# Patient Record
Sex: MALE | Race: WHITE | NOT HISPANIC OR LATINO | Employment: OTHER | URBAN - METROPOLITAN AREA
[De-identification: names, ages, dates, MRNs, and addresses within clinical notes are randomized per-mention and may not be internally consistent; named-entity substitution may affect disease eponyms.]

---

## 2019-01-01 ENCOUNTER — OFFICE VISIT (OUTPATIENT)
Dept: ONCOLOGY | Facility: CLINIC | Age: 74
End: 2019-01-01

## 2019-01-01 ENCOUNTER — CONSULT (OUTPATIENT)
Dept: RADIATION ONCOLOGY | Facility: HOSPITAL | Age: 74
End: 2019-01-01

## 2019-01-01 ENCOUNTER — HOSPITAL ENCOUNTER (OUTPATIENT)
Dept: RADIATION ONCOLOGY | Facility: HOSPITAL | Age: 74
Discharge: HOME | End: 2019-10-10

## 2019-01-01 ENCOUNTER — HOSPITAL ENCOUNTER (OUTPATIENT)
Dept: ONCOLOGY | Facility: HOSPITAL | Age: 74
Setting detail: INFUSION SERIES
Discharge: HOME | End: 2019-10-22

## 2019-01-01 ENCOUNTER — HOSPITAL ENCOUNTER (OUTPATIENT)
Dept: GENERAL RADIOLOGY | Facility: HOSPITAL | Age: 74
Discharge: HOME | End: 2019-08-20

## 2019-01-01 ENCOUNTER — HOSPITAL ENCOUNTER (OUTPATIENT)
Dept: RADIATION ONCOLOGY | Facility: HOSPITAL | Age: 74
Discharge: HOME | End: 2019-10-17

## 2019-01-01 ENCOUNTER — HOSPITAL ENCOUNTER (INPATIENT)
Facility: HOSPITAL | Age: 74
LOS: 2 days | End: 2019-11-03
Attending: HOSPITALIST | Admitting: INTERNAL MEDICINE

## 2019-01-01 ENCOUNTER — OFFICE VISIT (OUTPATIENT)
Dept: WOUND CARE | Facility: HOSPITAL | Age: 74
End: 2019-01-01

## 2019-01-01 ENCOUNTER — HOSPITAL ENCOUNTER (OUTPATIENT)
Dept: RADIATION ONCOLOGY | Facility: HOSPITAL | Age: 74
Discharge: HOME | End: 2019-09-16

## 2019-01-01 ENCOUNTER — HOSPITAL ENCOUNTER (OUTPATIENT)
Dept: RADIATION ONCOLOGY | Facility: HOSPITAL | Age: 74
Setting detail: RADIATION/ONCOLOGY SERIES
End: 2019-01-01

## 2019-01-01 ENCOUNTER — HOSPITAL ENCOUNTER (OUTPATIENT)
Dept: ONCOLOGY | Facility: HOSPITAL | Age: 74
Setting detail: INFUSION SERIES
Discharge: HOME | End: 2019-09-18

## 2019-01-01 ENCOUNTER — HOSPITAL ENCOUNTER (OUTPATIENT)
Dept: RADIATION ONCOLOGY | Facility: HOSPITAL | Age: 74
Discharge: HOME | End: 2019-10-09

## 2019-01-01 ENCOUNTER — HOSPITAL ENCOUNTER (OUTPATIENT)
Dept: RADIATION ONCOLOGY | Facility: HOSPITAL | Age: 74
Discharge: HOME | End: 2019-10-11

## 2019-01-01 ENCOUNTER — HOSPITAL ENCOUNTER (OUTPATIENT)
Dept: RADIATION ONCOLOGY | Facility: HOSPITAL | Age: 74
Discharge: HOME | End: 2019-10-14

## 2019-01-01 ENCOUNTER — HOSPITAL ENCOUNTER (OUTPATIENT)
Dept: RADIATION ONCOLOGY | Facility: HOSPITAL | Age: 74
Discharge: HOME | End: 2019-10-28

## 2019-01-01 ENCOUNTER — HOSPITAL ENCOUNTER (INPATIENT)
Dept: CARDIOLOGY | Facility: HOSPITAL | Age: 74
Discharge: HOME | End: 2019-11-01

## 2019-01-01 ENCOUNTER — HOSPITAL ENCOUNTER (OUTPATIENT)
Dept: RADIATION ONCOLOGY | Facility: HOSPITAL | Age: 74
Discharge: HOME | End: 2019-10-25

## 2019-01-01 ENCOUNTER — HOSPITAL ENCOUNTER (OUTPATIENT)
Dept: RADIATION ONCOLOGY | Facility: HOSPITAL | Age: 74
Discharge: HOME | End: 2019-09-26

## 2019-01-01 ENCOUNTER — APPOINTMENT (OUTPATIENT)
Dept: ONCOLOGY | Facility: HOSPITAL | Age: 74
End: 2019-01-01

## 2019-01-01 ENCOUNTER — HOSPITAL ENCOUNTER (OUTPATIENT)
Dept: RADIATION ONCOLOGY | Facility: HOSPITAL | Age: 74
Discharge: HOME | End: 2019-10-01

## 2019-01-01 ENCOUNTER — APPOINTMENT (OUTPATIENT)
Dept: LAB | Facility: HOSPITAL | Age: 74
End: 2019-01-01

## 2019-01-01 ENCOUNTER — HOSPITAL ENCOUNTER (OUTPATIENT)
Dept: ONCOLOGY | Facility: HOSPITAL | Age: 74
Setting detail: INFUSION SERIES
End: 2019-01-01

## 2019-01-01 ENCOUNTER — LAB (OUTPATIENT)
Dept: LAB | Facility: HOSPITAL | Age: 74
End: 2019-01-01

## 2019-01-01 ENCOUNTER — HOSPITAL ENCOUNTER (OUTPATIENT)
Dept: ONCOLOGY | Facility: HOSPITAL | Age: 74
Setting detail: INFUSION SERIES
Discharge: HOME | End: 2019-10-07

## 2019-01-01 ENCOUNTER — HOSPITAL ENCOUNTER (OUTPATIENT)
Dept: ONCOLOGY | Facility: HOSPITAL | Age: 74
Setting detail: INFUSION SERIES
Discharge: HOME | End: 2019-09-20

## 2019-01-01 ENCOUNTER — HOSPITAL ENCOUNTER (OUTPATIENT)
Dept: RADIATION ONCOLOGY | Facility: HOSPITAL | Age: 74
Discharge: HOME | End: 2019-10-07

## 2019-01-01 ENCOUNTER — HOSPITAL ENCOUNTER (OUTPATIENT)
Dept: RADIATION ONCOLOGY | Facility: HOSPITAL | Age: 74
Discharge: HOME | End: 2019-09-30

## 2019-01-01 ENCOUNTER — HOSPITAL ENCOUNTER (OUTPATIENT)
Dept: RADIATION ONCOLOGY | Facility: HOSPITAL | Age: 74
Discharge: HOME | End: 2019-10-23

## 2019-01-01 ENCOUNTER — HOSPITAL ENCOUNTER (OUTPATIENT)
Dept: RADIATION ONCOLOGY | Facility: HOSPITAL | Age: 74
Discharge: HOME | End: 2019-09-17

## 2019-01-01 ENCOUNTER — HOSPITAL ENCOUNTER (OUTPATIENT)
Dept: ONCOLOGY | Facility: HOSPITAL | Age: 74
Setting detail: INFUSION SERIES
Discharge: HOME | End: 2019-10-16

## 2019-01-01 ENCOUNTER — HOSPITAL ENCOUNTER (OUTPATIENT)
Dept: ONCOLOGY | Facility: HOSPITAL | Age: 74
Discharge: HOME | End: 2019-10-28
Admitting: INTERNAL MEDICINE

## 2019-01-01 ENCOUNTER — HOSPITAL ENCOUNTER (OUTPATIENT)
Dept: RADIATION ONCOLOGY | Facility: HOSPITAL | Age: 74
Discharge: HOME | End: 2019-10-22

## 2019-01-01 ENCOUNTER — TELEPHONE (OUTPATIENT)
Dept: ONCOLOGY | Facility: CLINIC | Age: 74
End: 2019-01-01

## 2019-01-01 ENCOUNTER — RESULTS ENCOUNTER (OUTPATIENT)
Dept: ONCOLOGY | Facility: CLINIC | Age: 74
End: 2019-01-01

## 2019-01-01 ENCOUNTER — HOSPITAL ENCOUNTER (OUTPATIENT)
Dept: RADIATION ONCOLOGY | Facility: HOSPITAL | Age: 74
Discharge: HOME | End: 2019-10-21

## 2019-01-01 ENCOUNTER — HOSPITAL ENCOUNTER (OUTPATIENT)
Dept: CT IMAGING | Facility: HOSPITAL | Age: 74
Discharge: HOME | End: 2019-10-01
Admitting: RADIOLOGY

## 2019-01-01 ENCOUNTER — HOSPITAL ENCOUNTER (OUTPATIENT)
Dept: RADIATION ONCOLOGY | Facility: HOSPITAL | Age: 74
Discharge: HOME | End: 2019-09-27

## 2019-01-01 ENCOUNTER — APPOINTMENT (OUTPATIENT)
Dept: GENERAL RADIOLOGY | Facility: HOSPITAL | Age: 74
End: 2019-01-01

## 2019-01-01 ENCOUNTER — HOSPITAL ENCOUNTER (OUTPATIENT)
Dept: RADIATION ONCOLOGY | Facility: HOSPITAL | Age: 74
Discharge: HOME | End: 2019-10-08

## 2019-01-01 ENCOUNTER — HOSPITAL ENCOUNTER (OUTPATIENT)
Dept: RADIATION ONCOLOGY | Facility: HOSPITAL | Age: 74
Discharge: HOME | End: 2019-09-19

## 2019-01-01 ENCOUNTER — HOSPITAL ENCOUNTER (OUTPATIENT)
Dept: ONCOLOGY | Facility: HOSPITAL | Age: 74
Setting detail: INFUSION SERIES
Discharge: HOME | End: 2019-10-14

## 2019-01-01 ENCOUNTER — HOSPITAL ENCOUNTER (OUTPATIENT)
Dept: ONCOLOGY | Facility: HOSPITAL | Age: 74
Setting detail: INFUSION SERIES
Discharge: HOME | End: 2019-10-24

## 2019-01-01 ENCOUNTER — HOSPITAL ENCOUNTER (OUTPATIENT)
Dept: ONCOLOGY | Facility: HOSPITAL | Age: 74
Setting detail: INFUSION SERIES
Discharge: HOME | End: 2019-11-01

## 2019-01-01 ENCOUNTER — HOSPITAL ENCOUNTER (OUTPATIENT)
Dept: ONCOLOGY | Facility: HOSPITAL | Age: 74
Setting detail: INFUSION SERIES
Discharge: HOME | End: 2019-09-23

## 2019-01-01 ENCOUNTER — HOSPITAL ENCOUNTER (OUTPATIENT)
Dept: RADIATION ONCOLOGY | Facility: HOSPITAL | Age: 74
Discharge: HOME | End: 2019-10-03

## 2019-01-01 ENCOUNTER — HOSPITAL ENCOUNTER (OUTPATIENT)
Dept: RADIATION ONCOLOGY | Facility: HOSPITAL | Age: 74
Discharge: HOME | End: 2019-09-24

## 2019-01-01 ENCOUNTER — DOCUMENTATION (OUTPATIENT)
Dept: ONCOLOGY | Facility: HOSPITAL | Age: 74
End: 2019-01-01

## 2019-01-01 ENCOUNTER — HOSPITAL ENCOUNTER (OUTPATIENT)
Dept: ONCOLOGY | Facility: HOSPITAL | Age: 74
Discharge: HOME | End: 2019-10-30
Admitting: NURSE PRACTITIONER

## 2019-01-01 ENCOUNTER — HOSPITAL ENCOUNTER (OUTPATIENT)
Dept: PET IMAGING | Facility: HOSPITAL | Age: 74
Discharge: HOME | End: 2019-08-14
Admitting: INTERNAL MEDICINE

## 2019-01-01 ENCOUNTER — HOSPITAL ENCOUNTER (OUTPATIENT)
Dept: RADIATION ONCOLOGY | Facility: HOSPITAL | Age: 74
Discharge: HOME | End: 2019-09-20

## 2019-01-01 ENCOUNTER — HOSPITAL ENCOUNTER (OUTPATIENT)
Dept: ONCOLOGY | Facility: HOSPITAL | Age: 74
Discharge: HOME | End: 2019-10-31
Admitting: INTERNAL MEDICINE

## 2019-01-01 ENCOUNTER — HOSPITAL ENCOUNTER (OUTPATIENT)
Dept: INFUSION THERAPY | Facility: HOSPITAL | Age: 74
Setting detail: INFUSION SERIES
Discharge: HOME | End: 2019-09-13

## 2019-01-01 ENCOUNTER — HOSPITAL ENCOUNTER (OUTPATIENT)
Dept: INFUSION THERAPY | Facility: HOSPITAL | Age: 74
Setting detail: INFUSION SERIES
Discharge: HOME | End: 2019-09-11

## 2019-01-01 ENCOUNTER — HOSPITAL ENCOUNTER (OUTPATIENT)
Dept: ONCOLOGY | Facility: HOSPITAL | Age: 74
Setting detail: INFUSION SERIES
Discharge: HOME | End: 2019-10-23

## 2019-01-01 ENCOUNTER — HOSPITAL ENCOUNTER (OUTPATIENT)
Dept: RADIATION ONCOLOGY | Facility: HOSPITAL | Age: 74
Discharge: HOME | End: 2019-10-15

## 2019-01-01 ENCOUNTER — HOSPITAL ENCOUNTER (OUTPATIENT)
Dept: RADIATION ONCOLOGY | Facility: HOSPITAL | Age: 74
Discharge: HOME | End: 2019-10-24

## 2019-01-01 ENCOUNTER — HOSPITAL ENCOUNTER (OUTPATIENT)
Dept: ONCOLOGY | Facility: HOSPITAL | Age: 74
Discharge: HOME | End: 2019-10-29
Admitting: NURSE PRACTITIONER

## 2019-01-01 ENCOUNTER — HOSPITAL ENCOUNTER (OUTPATIENT)
Dept: OTHER | Facility: HOSPITAL | Age: 74
Discharge: HOME | End: 2019-08-12

## 2019-01-01 ENCOUNTER — HOSPITAL ENCOUNTER (OUTPATIENT)
Dept: ONCOLOGY | Facility: HOSPITAL | Age: 74
Setting detail: INFUSION SERIES
Discharge: HOME | End: 2019-09-16

## 2019-01-01 ENCOUNTER — HOSPITAL ENCOUNTER (OUTPATIENT)
Dept: INFUSION THERAPY | Facility: HOSPITAL | Age: 74
Setting detail: INFUSION SERIES
Discharge: HOME | End: 2019-10-30

## 2019-01-01 ENCOUNTER — HOSPITAL ENCOUNTER (OUTPATIENT)
Dept: ONCOLOGY | Facility: HOSPITAL | Age: 74
Setting detail: INFUSION SERIES
Discharge: HOME | End: 2019-09-19

## 2019-01-01 ENCOUNTER — HOSPITAL ENCOUNTER (OUTPATIENT)
Dept: RADIATION ONCOLOGY | Facility: HOSPITAL | Age: 74
Discharge: HOME | End: 2019-09-25

## 2019-01-01 ENCOUNTER — HOSPITAL ENCOUNTER (OUTPATIENT)
Dept: RADIATION ONCOLOGY | Facility: HOSPITAL | Age: 74
Discharge: HOME | End: 2019-09-23

## 2019-01-01 ENCOUNTER — HOSPITAL ENCOUNTER (OUTPATIENT)
Dept: ONCOLOGY | Facility: HOSPITAL | Age: 74
Setting detail: INFUSION SERIES
Discharge: HOME | End: 2019-10-29

## 2019-01-01 ENCOUNTER — HOSPITAL ENCOUNTER (OUTPATIENT)
Dept: RADIATION ONCOLOGY | Facility: HOSPITAL | Age: 74
Discharge: HOME | End: 2019-10-04

## 2019-01-01 ENCOUNTER — HOSPITAL ENCOUNTER (OUTPATIENT)
Dept: ONCOLOGY | Facility: HOSPITAL | Age: 74
Setting detail: INFUSION SERIES
Discharge: HOME | End: 2019-09-17

## 2019-01-01 ENCOUNTER — HOSPITAL ENCOUNTER (OUTPATIENT)
Dept: CT IMAGING | Facility: HOSPITAL | Age: 74
Discharge: HOME | End: 2019-08-20
Admitting: INTERNAL MEDICINE

## 2019-01-01 ENCOUNTER — HOSPITAL ENCOUNTER (OUTPATIENT)
Dept: ONCOLOGY | Facility: HOSPITAL | Age: 74
Discharge: HOME | End: 2019-11-01
Admitting: INTERNAL MEDICINE

## 2019-01-01 ENCOUNTER — HOSPITAL ENCOUNTER (OUTPATIENT)
Dept: RADIATION ONCOLOGY | Facility: HOSPITAL | Age: 74
Discharge: HOME | End: 2019-10-18

## 2019-01-01 ENCOUNTER — HOSPITAL ENCOUNTER (OUTPATIENT)
Dept: INTERVENTIONAL RADIOLOGY/VASCULAR | Facility: HOSPITAL | Age: 74
Discharge: HOME | End: 2019-09-10
Admitting: NURSE PRACTITIONER

## 2019-01-01 ENCOUNTER — CONSULT (OUTPATIENT)
Dept: ONCOLOGY | Facility: CLINIC | Age: 74
End: 2019-01-01

## 2019-01-01 ENCOUNTER — HOSPITAL ENCOUNTER (OUTPATIENT)
Dept: RADIATION ONCOLOGY | Facility: HOSPITAL | Age: 74
Discharge: HOME | End: 2019-09-18

## 2019-01-01 ENCOUNTER — HOSPITAL ENCOUNTER (OUTPATIENT)
Dept: RADIATION ONCOLOGY | Facility: HOSPITAL | Age: 74
Discharge: HOME | End: 2019-10-02

## 2019-01-01 ENCOUNTER — APPOINTMENT (OUTPATIENT)
Dept: WOUND CARE | Facility: HOSPITAL | Age: 74
End: 2019-01-01

## 2019-01-01 ENCOUNTER — HOSPITAL ENCOUNTER (OUTPATIENT)
Dept: ONCOLOGY | Facility: HOSPITAL | Age: 74
Setting detail: INFUSION SERIES
Discharge: HOME | End: 2019-10-25

## 2019-01-01 ENCOUNTER — HOSPITAL ENCOUNTER (OUTPATIENT)
Dept: GENERAL RADIOLOGY | Facility: HOSPITAL | Age: 74
Discharge: HOME | End: 2019-10-14
Admitting: FAMILY MEDICINE

## 2019-01-01 ENCOUNTER — HOSPITAL ENCOUNTER (OUTPATIENT)
Dept: ONCOLOGY | Facility: HOSPITAL | Age: 74
Setting detail: INFUSION SERIES
Discharge: HOME | End: 2019-09-30

## 2019-01-01 ENCOUNTER — HOSPITAL ENCOUNTER (OUTPATIENT)
Dept: RADIATION ONCOLOGY | Facility: HOSPITAL | Age: 74
Discharge: HOME | End: 2019-10-16

## 2019-01-01 ENCOUNTER — HOSPITAL ENCOUNTER (OUTPATIENT)
Dept: ONCOLOGY | Facility: HOSPITAL | Age: 74
Setting detail: INFUSION SERIES
Discharge: HOME | End: 2019-10-21

## 2019-01-01 VITALS
HEART RATE: 103 BPM | DIASTOLIC BLOOD PRESSURE: 69 MMHG | HEIGHT: 65 IN | WEIGHT: 103 LBS | BODY MASS INDEX: 17.16 KG/M2 | TEMPERATURE: 97.7 F | RESPIRATION RATE: 18 BRPM | SYSTOLIC BLOOD PRESSURE: 103 MMHG

## 2019-01-01 VITALS
WEIGHT: 106.5 LBS | HEART RATE: 68 BPM | HEIGHT: 65 IN | TEMPERATURE: 97.9 F | RESPIRATION RATE: 18 BRPM | BODY MASS INDEX: 17.74 KG/M2 | DIASTOLIC BLOOD PRESSURE: 59 MMHG | SYSTOLIC BLOOD PRESSURE: 103 MMHG

## 2019-01-01 VITALS
SYSTOLIC BLOOD PRESSURE: 108 MMHG | HEART RATE: 92 BPM | HEIGHT: 65 IN | OXYGEN SATURATION: 91 % | WEIGHT: 103 LBS | DIASTOLIC BLOOD PRESSURE: 61 MMHG | TEMPERATURE: 97.6 F | RESPIRATION RATE: 18 BRPM | BODY MASS INDEX: 17.16 KG/M2

## 2019-01-01 VITALS
HEIGHT: 65 IN | WEIGHT: 104 LBS | TEMPERATURE: 98.1 F | SYSTOLIC BLOOD PRESSURE: 131 MMHG | HEART RATE: 91 BPM | BODY MASS INDEX: 17.33 KG/M2 | DIASTOLIC BLOOD PRESSURE: 68 MMHG

## 2019-01-01 VITALS
HEIGHT: 65 IN | OXYGEN SATURATION: 99 % | DIASTOLIC BLOOD PRESSURE: 61 MMHG | BODY MASS INDEX: 16.33 KG/M2 | TEMPERATURE: 98.2 F | WEIGHT: 98 LBS | RESPIRATION RATE: 18 BRPM | SYSTOLIC BLOOD PRESSURE: 92 MMHG | HEART RATE: 98 BPM

## 2019-01-01 VITALS
HEART RATE: 76 BPM | DIASTOLIC BLOOD PRESSURE: 57 MMHG | OXYGEN SATURATION: 96 % | RESPIRATION RATE: 18 BRPM | SYSTOLIC BLOOD PRESSURE: 104 MMHG | HEIGHT: 65 IN | BODY MASS INDEX: 18.16 KG/M2 | TEMPERATURE: 98.4 F | WEIGHT: 109 LBS

## 2019-01-01 VITALS
OXYGEN SATURATION: 98 % | SYSTOLIC BLOOD PRESSURE: 115 MMHG | HEIGHT: 65 IN | WEIGHT: 111 LBS | TEMPERATURE: 98.2 F | BODY MASS INDEX: 18.49 KG/M2 | DIASTOLIC BLOOD PRESSURE: 63 MMHG | HEART RATE: 74 BPM | RESPIRATION RATE: 18 BRPM

## 2019-01-01 VITALS
TEMPERATURE: 97.2 F | BODY MASS INDEX: 17.07 KG/M2 | RESPIRATION RATE: 18 BRPM | OXYGEN SATURATION: 98 % | DIASTOLIC BLOOD PRESSURE: 51 MMHG | HEART RATE: 111 BPM | SYSTOLIC BLOOD PRESSURE: 86 MMHG | HEIGHT: 65 IN

## 2019-01-01 VITALS
RESPIRATION RATE: 18 BRPM | SYSTOLIC BLOOD PRESSURE: 120 MMHG | WEIGHT: 105.6 LBS | BODY MASS INDEX: 17.59 KG/M2 | HEART RATE: 82 BPM | TEMPERATURE: 98.4 F | HEIGHT: 65 IN | DIASTOLIC BLOOD PRESSURE: 68 MMHG

## 2019-01-01 VITALS
DIASTOLIC BLOOD PRESSURE: 66 MMHG | SYSTOLIC BLOOD PRESSURE: 100 MMHG | TEMPERATURE: 98 F | HEART RATE: 75 BPM | HEIGHT: 65 IN | WEIGHT: 109.1 LBS | RESPIRATION RATE: 18 BRPM | BODY MASS INDEX: 18.18 KG/M2

## 2019-01-01 VITALS
HEART RATE: 80 BPM | BODY MASS INDEX: 17.49 KG/M2 | WEIGHT: 105 LBS | DIASTOLIC BLOOD PRESSURE: 62 MMHG | HEIGHT: 65 IN | TEMPERATURE: 98.4 F | RESPIRATION RATE: 16 BRPM | SYSTOLIC BLOOD PRESSURE: 105 MMHG

## 2019-01-01 VITALS
TEMPERATURE: 98.7 F | WEIGHT: 101.5 LBS | HEART RATE: 89 BPM | DIASTOLIC BLOOD PRESSURE: 68 MMHG | RESPIRATION RATE: 20 BRPM | SYSTOLIC BLOOD PRESSURE: 105 MMHG | BODY MASS INDEX: 16.89 KG/M2

## 2019-01-01 VITALS
DIASTOLIC BLOOD PRESSURE: 51 MMHG | SYSTOLIC BLOOD PRESSURE: 100 MMHG | RESPIRATION RATE: 18 BRPM | TEMPERATURE: 98 F | HEART RATE: 124 BPM | OXYGEN SATURATION: 94 %

## 2019-01-01 VITALS
SYSTOLIC BLOOD PRESSURE: 123 MMHG | RESPIRATION RATE: 22 BRPM | HEART RATE: 84 BPM | HEIGHT: 65 IN | DIASTOLIC BLOOD PRESSURE: 64 MMHG | WEIGHT: 105.5 LBS | BODY MASS INDEX: 17.58 KG/M2 | TEMPERATURE: 97.7 F

## 2019-01-01 VITALS
WEIGHT: 102.6 LBS | BODY MASS INDEX: 17.09 KG/M2 | HEIGHT: 65 IN | SYSTOLIC BLOOD PRESSURE: 100 MMHG | RESPIRATION RATE: 18 BRPM | DIASTOLIC BLOOD PRESSURE: 62 MMHG | TEMPERATURE: 98.6 F | HEART RATE: 103 BPM

## 2019-01-01 VITALS
HEIGHT: 65 IN | RESPIRATION RATE: 17 BRPM | DIASTOLIC BLOOD PRESSURE: 59 MMHG | SYSTOLIC BLOOD PRESSURE: 98 MMHG | OXYGEN SATURATION: 96 % | WEIGHT: 105 LBS | HEART RATE: 72 BPM | TEMPERATURE: 98.7 F | BODY MASS INDEX: 17.49 KG/M2

## 2019-01-01 VITALS
DIASTOLIC BLOOD PRESSURE: 70 MMHG | HEIGHT: 65 IN | SYSTOLIC BLOOD PRESSURE: 111 MMHG | WEIGHT: 104 LBS | OXYGEN SATURATION: 95 % | RESPIRATION RATE: 18 BRPM | TEMPERATURE: 97.4 F | HEART RATE: 84 BPM | BODY MASS INDEX: 17.33 KG/M2

## 2019-01-01 VITALS
SYSTOLIC BLOOD PRESSURE: 109 MMHG | HEIGHT: 65 IN | WEIGHT: 105.2 LBS | DIASTOLIC BLOOD PRESSURE: 69 MMHG | DIASTOLIC BLOOD PRESSURE: 63 MMHG | RESPIRATION RATE: 18 BRPM | OXYGEN SATURATION: 94 % | WEIGHT: 103 LBS | SYSTOLIC BLOOD PRESSURE: 113 MMHG | TEMPERATURE: 98.5 F | TEMPERATURE: 98.3 F | BODY MASS INDEX: 17.16 KG/M2 | RESPIRATION RATE: 18 BRPM | HEART RATE: 89 BPM | OXYGEN SATURATION: 95 % | HEART RATE: 77 BPM | BODY MASS INDEX: 17.51 KG/M2

## 2019-01-01 VITALS
HEIGHT: 65 IN | RESPIRATION RATE: 14 BRPM | OXYGEN SATURATION: 95 % | SYSTOLIC BLOOD PRESSURE: 115 MMHG | BODY MASS INDEX: 17.33 KG/M2 | HEART RATE: 81 BPM | DIASTOLIC BLOOD PRESSURE: 66 MMHG | TEMPERATURE: 97.3 F | WEIGHT: 104 LBS

## 2019-01-01 VITALS
TEMPERATURE: 98.7 F | HEART RATE: 100 BPM | BODY MASS INDEX: 17.07 KG/M2 | HEIGHT: 65 IN | DIASTOLIC BLOOD PRESSURE: 53 MMHG | SYSTOLIC BLOOD PRESSURE: 93 MMHG

## 2019-01-01 VITALS
HEIGHT: 65 IN | HEART RATE: 75 BPM | OXYGEN SATURATION: 93 % | DIASTOLIC BLOOD PRESSURE: 53 MMHG | SYSTOLIC BLOOD PRESSURE: 94 MMHG | TEMPERATURE: 97 F | BODY MASS INDEX: 17.49 KG/M2 | WEIGHT: 105 LBS | RESPIRATION RATE: 20 BRPM

## 2019-01-01 VITALS
HEART RATE: 79 BPM | DIASTOLIC BLOOD PRESSURE: 67 MMHG | BODY MASS INDEX: 17.16 KG/M2 | HEIGHT: 65 IN | SYSTOLIC BLOOD PRESSURE: 109 MMHG | WEIGHT: 103 LBS | OXYGEN SATURATION: 96 % | TEMPERATURE: 98.1 F | RESPIRATION RATE: 18 BRPM

## 2019-01-01 VITALS
WEIGHT: 103.8 LBS | SYSTOLIC BLOOD PRESSURE: 92 MMHG | RESPIRATION RATE: 20 BRPM | TEMPERATURE: 97.8 F | HEIGHT: 65 IN | HEART RATE: 78 BPM | BODY MASS INDEX: 17.29 KG/M2 | DIASTOLIC BLOOD PRESSURE: 58 MMHG

## 2019-01-01 VITALS
WEIGHT: 106 LBS | OXYGEN SATURATION: 95 % | HEART RATE: 65 BPM | BODY MASS INDEX: 17.66 KG/M2 | RESPIRATION RATE: 14 BRPM | SYSTOLIC BLOOD PRESSURE: 110 MMHG | DIASTOLIC BLOOD PRESSURE: 68 MMHG | TEMPERATURE: 97.6 F | HEIGHT: 65 IN

## 2019-01-01 VITALS
RESPIRATION RATE: 22 BRPM | BODY MASS INDEX: 17.16 KG/M2 | TEMPERATURE: 97.6 F | WEIGHT: 103 LBS | HEIGHT: 65 IN | HEART RATE: 92 BPM | SYSTOLIC BLOOD PRESSURE: 115 MMHG | DIASTOLIC BLOOD PRESSURE: 76 MMHG

## 2019-01-01 VITALS — SYSTOLIC BLOOD PRESSURE: 116 MMHG | TEMPERATURE: 97.8 F | HEART RATE: 83 BPM | DIASTOLIC BLOOD PRESSURE: 72 MMHG

## 2019-01-01 VITALS
TEMPERATURE: 97.8 F | SYSTOLIC BLOOD PRESSURE: 107 MMHG | HEART RATE: 70 BPM | DIASTOLIC BLOOD PRESSURE: 61 MMHG | OXYGEN SATURATION: 93 % | BODY MASS INDEX: 17.49 KG/M2 | RESPIRATION RATE: 18 BRPM | HEIGHT: 65 IN | WEIGHT: 105 LBS

## 2019-01-01 VITALS
WEIGHT: 106 LBS | DIASTOLIC BLOOD PRESSURE: 69 MMHG | BODY MASS INDEX: 17.66 KG/M2 | TEMPERATURE: 98 F | HEIGHT: 65 IN | HEART RATE: 72 BPM | OXYGEN SATURATION: 94 % | RESPIRATION RATE: 18 BRPM | SYSTOLIC BLOOD PRESSURE: 109 MMHG

## 2019-01-01 VITALS
HEART RATE: 75 BPM | RESPIRATION RATE: 18 BRPM | SYSTOLIC BLOOD PRESSURE: 108 MMHG | TEMPERATURE: 98.1 F | DIASTOLIC BLOOD PRESSURE: 55 MMHG

## 2019-01-01 DIAGNOSIS — Z00.6 EXAMINATION FOR NORMAL COMPARISON OR CONTROL IN CLINICAL RESEARCH: ICD-10-CM

## 2019-01-01 DIAGNOSIS — E86.0 DEHYDRATION: ICD-10-CM

## 2019-01-01 DIAGNOSIS — C34.31 MALIGNANT NEOPLASM OF LOWER LOBE OF RIGHT LUNG (HCC): Primary | ICD-10-CM

## 2019-01-01 DIAGNOSIS — C34.31 MALIGNANT NEOPLASM OF LOWER LOBE OF RIGHT LUNG (HCC): ICD-10-CM

## 2019-01-01 DIAGNOSIS — E83.42 HYPOMAGNESEMIA: ICD-10-CM

## 2019-01-01 DIAGNOSIS — R91.8 LUNG MASS: ICD-10-CM

## 2019-01-01 DIAGNOSIS — T45.1X5A CHEMOTHERAPY INDUCED NEUTROPENIA (HCC): ICD-10-CM

## 2019-01-01 DIAGNOSIS — C77.9 SECONDARY AND UNSPECIFIED MALIGNANT NEOPLASM OF LYMPH NODE, UNSPECIFIED (HCC): ICD-10-CM

## 2019-01-01 DIAGNOSIS — I47.1 SUPRAVENTRICULAR TACHYCARDIA (HCC): ICD-10-CM

## 2019-01-01 DIAGNOSIS — R53.1 WEAKNESS: ICD-10-CM

## 2019-01-01 DIAGNOSIS — D70.1 CHEMOTHERAPY INDUCED NEUTROPENIA (HCC): ICD-10-CM

## 2019-01-01 DIAGNOSIS — E86.0 DEHYDRATION: Primary | ICD-10-CM

## 2019-01-01 DIAGNOSIS — I48.91 ATRIAL FIBRILLATION WITH RVR (HCC): Primary | ICD-10-CM

## 2019-01-01 DIAGNOSIS — D69.6 THROMBOCYTOPENIA (HCC): Primary | ICD-10-CM

## 2019-01-01 DIAGNOSIS — L98.429 SKIN ULCER OF SACRUM, UNSPECIFIED ULCER STAGE (HCC): ICD-10-CM

## 2019-01-01 DIAGNOSIS — K59.03 CONSTIPATION DUE TO OPIOID THERAPY: ICD-10-CM

## 2019-01-01 DIAGNOSIS — R11.0 NAUSEA: ICD-10-CM

## 2019-01-01 DIAGNOSIS — J43.1 PANLOBULAR EMPHYSEMA (HCC): ICD-10-CM

## 2019-01-01 DIAGNOSIS — D70.1 CHEMOTHERAPY INDUCED NEUTROPENIA (HCC): Primary | ICD-10-CM

## 2019-01-01 DIAGNOSIS — R91.8 LUNG MASS: Primary | ICD-10-CM

## 2019-01-01 DIAGNOSIS — G89.3 CANCER ASSOCIATED PAIN: ICD-10-CM

## 2019-01-01 DIAGNOSIS — T40.2X5A CONSTIPATION DUE TO OPIOID THERAPY: ICD-10-CM

## 2019-01-01 DIAGNOSIS — T45.1X5A CHEMOTHERAPY INDUCED NEUTROPENIA (HCC): Primary | ICD-10-CM

## 2019-01-01 LAB
ABO + RH BLD: NORMAL
ABO GROUP BLD: NORMAL
ABO GROUP BLD: NORMAL
ALBUMIN SERPL-MCNC: 2.5 G/DL (ref 3.5–4.8)
ALBUMIN SERPL-MCNC: 2.5 G/DL (ref 3.5–4.8)
ALBUMIN SERPL-MCNC: 2.7 G/DL (ref 3.5–4.8)
ALBUMIN SERPL-MCNC: 2.7 G/DL (ref 3.5–4.8)
ALBUMIN SERPL-MCNC: 2.8 G/DL (ref 3.5–4.8)
ALBUMIN SERPL-MCNC: 2.9 G/DL (ref 3.5–4.8)
ALBUMIN SERPL-MCNC: 2.9 G/DL (ref 3.5–4.8)
ALBUMIN SERPL-MCNC: 2.9 G/DL (ref 3.5–5.2)
ALBUMIN SERPL-MCNC: 3.4 G/DL (ref 3.5–4.8)
ALBUMIN SERPL-MCNC: 3.8 G/DL (ref 3.5–4.8)
ALBUMIN/GLOB SERPL: 0.9 G/DL (ref 1–1.7)
ALBUMIN/GLOB SERPL: 1 G/DL (ref 1–1.7)
ALBUMIN/GLOB SERPL: 1 G/DL (ref 1–1.7)
ALBUMIN/GLOB SERPL: 1.2 G/DL
ALBUMIN/GLOB SERPL: 1.4 {RATIO} (ref 1.2–2.2)
ALP SERPL-CCNC: 102 U/L (ref 32–91)
ALP SERPL-CCNC: 127 IU/L (ref 39–117)
ALP SERPL-CCNC: 76 U/L (ref 32–91)
ALP SERPL-CCNC: 80 U/L (ref 32–91)
ALP SERPL-CCNC: 83 U/L (ref 32–91)
ALP SERPL-CCNC: 83 U/L (ref 32–91)
ALP SERPL-CCNC: 84 U/L (ref 32–91)
ALP SERPL-CCNC: 87 U/L (ref 32–91)
ALP SERPL-CCNC: 88 U/L (ref 32–91)
ALP SERPL-CCNC: 94 U/L (ref 39–117)
ALT SERPL W P-5'-P-CCNC: 23 U/L (ref 17–63)
ALT SERPL W P-5'-P-CCNC: 31 U/L (ref 17–63)
ALT SERPL W P-5'-P-CCNC: 31 U/L (ref 17–63)
ALT SERPL W P-5'-P-CCNC: 32 U/L (ref 17–63)
ALT SERPL W P-5'-P-CCNC: 33 U/L (ref 17–63)
ALT SERPL W P-5'-P-CCNC: 35 U/L (ref 17–63)
ALT SERPL W P-5'-P-CCNC: 35 U/L (ref 17–63)
ALT SERPL W P-5'-P-CCNC: 36 U/L (ref 17–63)
ALT SERPL W P-5'-P-CCNC: 37 U/L (ref 1–41)
ALT SERPL-CCNC: 31 IU/L (ref 0–44)
ANION GAP SERPL CALCULATED.3IONS-SCNC: 11 MMOL/L (ref 5–15)
ANION GAP SERPL CALCULATED.3IONS-SCNC: 11.2 MMOL/L (ref 5–15)
ANION GAP SERPL CALCULATED.3IONS-SCNC: 12 MMOL/L (ref 5–15)
ANION GAP SERPL CALCULATED.3IONS-SCNC: 12.1 MMOL/L (ref 5–15)
ANION GAP SERPL CALCULATED.3IONS-SCNC: 12.8 MMOL/L (ref 5–15)
ANION GAP SERPL CALCULATED.3IONS-SCNC: 15.5 MMOL/L (ref 5–15)
ANION GAP SERPL CALCULATED.3IONS-SCNC: 7 MMOL/L (ref 5–15)
ANION GAP SERPL CALCULATED.3IONS-SCNC: 8 MMOL/L (ref 5–15)
ANION GAP SERPL CALCULATED.3IONS-SCNC: 9 MMOL/L (ref 5–15)
APTT PPP: 24.5 SECONDS (ref 24–31)
APTT PPP: 25.2 SECONDS (ref 24–31)
ARTERIAL PATENCY WRIST A: POSITIVE
AST SERPL-CCNC: 22 U/L (ref 15–41)
AST SERPL-CCNC: 22 U/L (ref 1–40)
AST SERPL-CCNC: 24 U/L (ref 15–41)
AST SERPL-CCNC: 25 U/L (ref 15–41)
AST SERPL-CCNC: 26 U/L (ref 15–41)
AST SERPL-CCNC: 27 U/L (ref 15–41)
AST SERPL-CCNC: 30 IU/L (ref 0–40)
AST SERPL-CCNC: 30 U/L (ref 15–41)
ATMOSPHERIC PRESS: ABNORMAL MM[HG]
B PERT DNA SPEC QL NAA+PROBE: NOT DETECTED
BASE EXCESS BLDA CALC-SCNC: 3.7 MMOL/L (ref 0–3)
BASE EXCESS BLDA CALC-SCNC: 4.9 MMOL/L (ref 0–3)
BASE EXCESS BLDA CALC-SCNC: 6.5 MMOL/L (ref 0–3)
BASOPHILS # BLD AUTO: 0 10*3/MM3 (ref 0–0.2)
BASOPHILS # BLD AUTO: 0 X10E3/UL (ref 0–0.2)
BASOPHILS # BLD AUTO: 0.01 10*3/MM3 (ref 0–0.2)
BASOPHILS # BLD AUTO: 0.02 10*3/MM3 (ref 0–0.2)
BASOPHILS # BLD AUTO: 0.02 10*3/MM3 (ref 0–0.2)
BASOPHILS # BLD AUTO: 0.04 10*3/MM3 (ref 0–0.2)
BASOPHILS # BLD AUTO: 0.04 10*3/MM3 (ref 0–0.2)
BASOPHILS # BLD AUTO: 0.1 10*3/MM3 (ref 0–0.2)
BASOPHILS # BLD AUTO: 0.1 10*3/MM3 (ref 0–0.2)
BASOPHILS NFR BLD AUTO: 0 %
BASOPHILS NFR BLD AUTO: 0 % (ref 0–1.5)
BASOPHILS NFR BLD AUTO: 0.1 % (ref 0–1.5)
BASOPHILS NFR BLD AUTO: 0.2 % (ref 0–1.5)
BASOPHILS NFR BLD AUTO: 0.4 % (ref 0–1.5)
BASOPHILS NFR BLD AUTO: 0.5 % (ref 0–1.5)
BASOPHILS NFR BLD AUTO: 0.7 % (ref 0–1.5)
BASOPHILS NFR BLD AUTO: 1.1 % (ref 0–1.5)
BASOPHILS NFR BLD AUTO: ABNORMAL %
BDY SITE: ABNORMAL
BH BB BLOOD EXPIRATION DATE: NORMAL
BH BB BLOOD TYPE BARCODE: 6200
BH BB DISPENSE STATUS: NORMAL
BH BB PRODUCT CODE: NORMAL
BH BB UNIT NUMBER: NORMAL
BH CV ECHO MEAS - ACS: 1.7 CM
BH CV ECHO MEAS - AO MAX PG (FULL): 1.6 MMHG
BH CV ECHO MEAS - AO MAX PG: 7.2 MMHG
BH CV ECHO MEAS - AO MEAN PG (FULL): 1.6 MMHG
BH CV ECHO MEAS - AO MEAN PG: 3.9 MMHG
BH CV ECHO MEAS - AO ROOT AREA (BSA CORRECTED): 1.9
BH CV ECHO MEAS - AO ROOT AREA: 6.4 CM^2
BH CV ECHO MEAS - AO ROOT DIAM: 2.9 CM
BH CV ECHO MEAS - AO V2 MAX: 134.3 CM/SEC
BH CV ECHO MEAS - AO V2 MEAN: 91 CM/SEC
BH CV ECHO MEAS - AO V2 VTI: 19 CM
BH CV ECHO MEAS - AORTIC HR: 105.8 BPM
BH CV ECHO MEAS - AORTIC R-R: 0.57 SEC
BH CV ECHO MEAS - ASC AORTA: 2.7 CM
BH CV ECHO MEAS - AVA(I,A): 2 CM^2
BH CV ECHO MEAS - AVA(I,D): 2 CM^2
BH CV ECHO MEAS - AVA(V,A): 2.3 CM^2
BH CV ECHO MEAS - AVA(V,D): 2.3 CM^2
BH CV ECHO MEAS - BSA(HAYCOCK): 1.4 M^2
BH CV ECHO MEAS - BSA: 1.5 M^2
BH CV ECHO MEAS - BZI_BMI: 17 KILOGRAMS/M^2
BH CV ECHO MEAS - BZI_METRIC_HEIGHT: 165.1 CM
BH CV ECHO MEAS - BZI_METRIC_WEIGHT: 46.3 KG
BH CV ECHO MEAS - CI(AO): 8.7 L/MIN/M^2
BH CV ECHO MEAS - CI(LVOT): 2.8 L/MIN/M^2
BH CV ECHO MEAS - CO(AO): 12.9 L/MIN
BH CV ECHO MEAS - CO(LVOT): 4.1 L/MIN
BH CV ECHO MEAS - EDV(CUBED): 144.8 ML
BH CV ECHO MEAS - EDV(MOD-SP4): 112.7 ML
BH CV ECHO MEAS - EDV(TEICH): 132.5 ML
BH CV ECHO MEAS - EF(CUBED): 23.4 %
BH CV ECHO MEAS - EF(MOD-BP): 20 %
BH CV ECHO MEAS - EF(MOD-SP4): 31 %
BH CV ECHO MEAS - EF(TEICH): 18.6 %
BH CV ECHO MEAS - ESV(CUBED): 110.9 ML
BH CV ECHO MEAS - ESV(MOD-SP4): 77.7 ML
BH CV ECHO MEAS - ESV(TEICH): 107.8 ML
BH CV ECHO MEAS - FS: 8.5 %
BH CV ECHO MEAS - IVS/LVPW: 0.7
BH CV ECHO MEAS - IVSD: 0.49 CM
BH CV ECHO MEAS - LA DIMENSION(2D): 3.4 CM
BH CV ECHO MEAS - LV DIASTOLIC VOL/BSA (35-75): 75.9 ML/M^2
BH CV ECHO MEAS - LV MASS(C)D: 101.9 GRAMS
BH CV ECHO MEAS - LV MASS(C)DI: 68.6 GRAMS/M^2
BH CV ECHO MEAS - LV MAX PG: 5.7 MMHG
BH CV ECHO MEAS - LV MEAN PG: 2.3 MMHG
BH CV ECHO MEAS - LV SYSTOLIC VOL/BSA (12-30): 52.3 ML/M^2
BH CV ECHO MEAS - LV V1 MAX: 119 CM/SEC
BH CV ECHO MEAS - LV V1 MEAN: 67.5 CM/SEC
BH CV ECHO MEAS - LV V1 VTI: 14.9 CM
BH CV ECHO MEAS - LVIDD: 5.3 CM
BH CV ECHO MEAS - LVIDS: 4.8 CM
BH CV ECHO MEAS - LVOT AREA: 2.6 CM^2
BH CV ECHO MEAS - LVOT DIAM: 1.8 CM
BH CV ECHO MEAS - LVPWD: 0.7 CM
BH CV ECHO MEAS - MR MAX PG: 68.9 MMHG
BH CV ECHO MEAS - MR MAX VEL: 415.1 CM/SEC
BH CV ECHO MEAS - MV MAX PG: 5.4 MMHG
BH CV ECHO MEAS - MV MEAN PG: 2.1 MMHG
BH CV ECHO MEAS - MV V2 MAX: 116.2 CM/SEC
BH CV ECHO MEAS - MV V2 MEAN: 67.5 CM/SEC
BH CV ECHO MEAS - MV V2 VTI: 13.9 CM
BH CV ECHO MEAS - MVA(VTI): 2.8 CM^2
BH CV ECHO MEAS - PA MAX PG (FULL): 4.4 MMHG
BH CV ECHO MEAS - PA MAX PG: 7.2 MMHG
BH CV ECHO MEAS - PA MEAN PG (FULL): 2.4 MMHG
BH CV ECHO MEAS - PA MEAN PG: 3.5 MMHG
BH CV ECHO MEAS - PA V2 MAX: 133.9 CM/SEC
BH CV ECHO MEAS - PA V2 MEAN: 88 CM/SEC
BH CV ECHO MEAS - PA V2 VTI: 20.1 CM
BH CV ECHO MEAS - RAP SYSTOLE: 3 MMHG
BH CV ECHO MEAS - RV MAX PG: 2.8 MMHG
BH CV ECHO MEAS - RV MEAN PG: 1.1 MMHG
BH CV ECHO MEAS - RV V1 MAX: 83.3 CM/SEC
BH CV ECHO MEAS - RV V1 MEAN: 48.2 CM/SEC
BH CV ECHO MEAS - RV V1 VTI: 12.3 CM
BH CV ECHO MEAS - RVDD: 2.2 CM
BH CV ECHO MEAS - RVSP: 39.2 MMHG
BH CV ECHO MEAS - SI(AO): 82.2 ML/M^2
BH CV ECHO MEAS - SI(CUBED): 22.8 ML/M^2
BH CV ECHO MEAS - SI(LVOT): 26.2 ML/M^2
BH CV ECHO MEAS - SI(MOD-SP4): 23.6 ML/M^2
BH CV ECHO MEAS - SI(TEICH): 16.6 ML/M^2
BH CV ECHO MEAS - SV(AO): 122.1 ML
BH CV ECHO MEAS - SV(CUBED): 33.9 ML
BH CV ECHO MEAS - SV(LVOT): 39 ML
BH CV ECHO MEAS - SV(MOD-SP4): 35 ML
BH CV ECHO MEAS - SV(TEICH): 24.7 ML
BH CV ECHO MEAS - TR MAX VEL: 300.8 CM/SEC
BILIRUB SERPL-MCNC: 0.3 MG/DL (ref 0.3–1.2)
BILIRUB SERPL-MCNC: 0.4 MG/DL (ref 0.3–1.2)
BILIRUB SERPL-MCNC: 0.4 MG/DL (ref 0.3–1.2)
BILIRUB SERPL-MCNC: 0.5 MG/DL (ref 0.2–1.2)
BILIRUB SERPL-MCNC: 0.5 MG/DL (ref 0.3–1.2)
BILIRUB SERPL-MCNC: 0.5 MG/DL (ref 0.3–1.2)
BILIRUB SERPL-MCNC: 0.5 MG/DL (ref 0–1.2)
BILIRUB SERPL-MCNC: 0.6 MG/DL (ref 0.3–1.2)
BLD GP AB SCN SERPL QL: NEGATIVE
BUN BLD-MCNC: 10 MG/DL (ref 8–20)
BUN BLD-MCNC: 11 MG/DL (ref 8–20)
BUN BLD-MCNC: 11 MG/DL (ref 8–20)
BUN BLD-MCNC: 12 MG/DL (ref 8–20)
BUN BLD-MCNC: 13 MG/DL (ref 8–20)
BUN BLD-MCNC: 14 MG/DL (ref 8–20)
BUN BLD-MCNC: 15 MG/DL (ref 8–20)
BUN BLD-MCNC: 15 MG/DL (ref 8–23)
BUN BLD-MCNC: 17 MG/DL (ref 8–23)
BUN BLD-MCNC: 17 MG/DL (ref 8–23)
BUN BLD-MCNC: 21 MG/DL (ref 8–23)
BUN BLD-MCNC: 8 MG/DL (ref 8–20)
BUN BLD-MCNC: 9 MG/DL (ref 8–23)
BUN SERPL-MCNC: 9 MG/DL (ref 8–27)
BUN/CREAT SERPL: 16 (ref 10–24)
BUN/CREAT SERPL: 16 (ref 6.2–20.3)
BUN/CREAT SERPL: 18.3 (ref 6.2–20.3)
BUN/CREAT SERPL: 20 (ref 6.2–20.3)
BUN/CREAT SERPL: 22 (ref 6.2–20.3)
BUN/CREAT SERPL: 26 (ref 6.2–20.3)
BUN/CREAT SERPL: 27.3 (ref 7–25)
BUN/CREAT SERPL: 28 (ref 6.2–20.3)
BUN/CREAT SERPL: 30 (ref 6.2–20.3)
BUN/CREAT SERPL: 30 (ref 6.2–20.3)
BUN/CREAT SERPL: 40.5 (ref 7–25)
BUN/CREAT SERPL: 47.2 (ref 7–25)
BUN/CREAT SERPL: 50 (ref 7–25)
BUN/CREAT SERPL: 51.5 (ref 7–25)
C PNEUM DNA NPH QL NAA+NON-PROBE: NOT DETECTED
CA-I BLDA-SCNC: 0.86 MMOL/L (ref 1.15–1.33)
CA-I SERPL ISE-MCNC: 1.07 MMOL/L (ref 1.2–1.3)
CA-I SERPL ISE-MCNC: 1.11 MMOL/L (ref 1.2–1.3)
CALCIUM SERPL-MCNC: 9 MG/DL (ref 8.6–10.2)
CALCIUM SPEC-SCNC: 7.3 MG/DL (ref 8.6–10.5)
CALCIUM SPEC-SCNC: 7.7 MG/DL (ref 8.6–10.5)
CALCIUM SPEC-SCNC: 7.8 MG/DL (ref 8.9–10.3)
CALCIUM SPEC-SCNC: 8 MG/DL (ref 8.9–10.3)
CALCIUM SPEC-SCNC: 8.2 MG/DL (ref 8.6–10.5)
CALCIUM SPEC-SCNC: 8.3 MG/DL (ref 8.9–10.3)
CALCIUM SPEC-SCNC: 8.4 MG/DL (ref 8.9–10.3)
CALCIUM SPEC-SCNC: 8.6 MG/DL (ref 8.9–10.3)
CALCIUM SPEC-SCNC: 8.7 MG/DL (ref 8.9–10.3)
CALCIUM SPEC-SCNC: 8.9 MG/DL (ref 8.9–10.3)
CALCIUM SPEC-SCNC: 9.3 MG/DL (ref 8.9–10.3)
CEA SERPL-MCNC: 13.5 NG/ML (ref 0–4.7)
CHLORIDE SERPL-SCNC: 80 MMOL/L (ref 98–107)
CHLORIDE SERPL-SCNC: 82 MMOL/L (ref 98–107)
CHLORIDE SERPL-SCNC: 82 MMOL/L (ref 98–107)
CHLORIDE SERPL-SCNC: 84 MMOL/L (ref 98–107)
CHLORIDE SERPL-SCNC: 89 MMOL/L (ref 101–111)
CHLORIDE SERPL-SCNC: 89 MMOL/L (ref 101–111)
CHLORIDE SERPL-SCNC: 89 MMOL/L (ref 98–107)
CHLORIDE SERPL-SCNC: 94 MMOL/L (ref 101–111)
CHLORIDE SERPL-SCNC: 95 MMOL/L (ref 101–111)
CHLORIDE SERPL-SCNC: 96 MMOL/L (ref 96–106)
CHLORIDE SERPL-SCNC: 97 MMOL/L (ref 101–111)
CHLORIDE SERPL-SCNC: 97 MMOL/L (ref 101–111)
CHLORIDE SERPL-SCNC: 98 MMOL/L (ref 101–111)
CHLORIDE SERPL-SCNC: 99 MMOL/L (ref 101–111)
CO2 BLDA-SCNC: 28.4 MMOL/L (ref 22–29)
CO2 BLDA-SCNC: 30.6 MMOL/L (ref 22–29)
CO2 BLDA-SCNC: 30.7 MMOL/L (ref 22–29)
CO2 SERPL-SCNC: 28 MMOL/L (ref 20–29)
CO2 SERPL-SCNC: 28 MMOL/L (ref 22–32)
CO2 SERPL-SCNC: 29 MMOL/L (ref 22–29)
CO2 SERPL-SCNC: 30 MMOL/L (ref 22–29)
CO2 SERPL-SCNC: 30 MMOL/L (ref 22–29)
CO2 SERPL-SCNC: 31 MMOL/L (ref 22–29)
CO2 SERPL-SCNC: 31 MMOL/L (ref 22–32)
CO2 SERPL-SCNC: 31 MMOL/L (ref 22–32)
CO2 SERPL-SCNC: 32 MMOL/L (ref 22–29)
CO2 SERPL-SCNC: 32 MMOL/L (ref 22–32)
CO2 SERPL-SCNC: 32 MMOL/L (ref 22–32)
CO2 SERPL-SCNC: 33 MMOL/L (ref 22–32)
CREAT BLD-MCNC: 0.33 MG/DL (ref 0.76–1.27)
CREAT BLD-MCNC: 0.36 MG/DL (ref 0.76–1.27)
CREAT BLD-MCNC: 0.37 MG/DL (ref 0.76–1.27)
CREAT BLD-MCNC: 0.4 MG/DL (ref 0.7–1.2)
CREAT BLD-MCNC: 0.42 MG/DL (ref 0.76–1.27)
CREAT BLD-MCNC: 0.5 MG/DL (ref 0.7–1.2)
CREAT BLD-MCNC: 0.6 MG/DL (ref 0.7–1.2)
CREAT BLDA-MCNC: 0.3 MG/DL (ref 0.6–1.3)
CREAT BLDA-MCNC: 0.4 MG/DL (ref 0.6–1.3)
CREAT SERPL-MCNC: 0.56 MG/DL (ref 0.76–1.27)
D-LACTATE SERPL-SCNC: 1.4 MMOL/L (ref 0.5–2)
D-LACTATE SERPL-SCNC: 1.5 MMOL/L (ref 0.5–2)
D-LACTATE SERPL-SCNC: 1.6 MMOL/L (ref 0.5–2)
DEPRECATED RDW RBC AUTO: 35.4 FL (ref 37–54)
DEPRECATED RDW RBC AUTO: 38.1 FL (ref 37–54)
DEPRECATED RDW RBC AUTO: 38.1 FL (ref 37–54)
DEPRECATED RDW RBC AUTO: 38.4 FL (ref 37–54)
DEPRECATED RDW RBC AUTO: 38.5 FL (ref 37–54)
DEPRECATED RDW RBC AUTO: 38.9 FL (ref 37–54)
DEPRECATED RDW RBC AUTO: 39.4 FL (ref 37–54)
DEPRECATED RDW RBC AUTO: 39.4 FL (ref 37–54)
DEPRECATED RDW RBC AUTO: 39.6 FL (ref 37–54)
DEPRECATED RDW RBC AUTO: 40.7 FL (ref 37–54)
DEPRECATED RDW RBC AUTO: 41.9 FL (ref 37–54)
DEPRECATED RDW RBC AUTO: 42 FL (ref 37–54)
DEPRECATED RDW RBC AUTO: 42.7 FL (ref 37–54)
DEPRECATED RDW RBC AUTO: 43 FL (ref 37–54)
DEPRECATED RDW RBC AUTO: 43.1 FL (ref 37–54)
DEPRECATED RDW RBC AUTO: 44.5 FL (ref 37–54)
DEPRECATED RDW RBC AUTO: 44.6 FL (ref 37–54)
DEPRECATED RDW RBC AUTO: 45.5 FL (ref 37–54)
DEPRECATED RDW RBC AUTO: 47.5 FL (ref 37–54)
DEPRECATED RDW RBC AUTO: 48.1 FL (ref 37–54)
DEPRECATED RDW RBC AUTO: 48.9 FL (ref 37–54)
EOSINOPHIL # BLD AUTO: 0 10*3/MM3 (ref 0–0.4)
EOSINOPHIL # BLD AUTO: 0.01 10*3/MM3 (ref 0–0.4)
EOSINOPHIL # BLD AUTO: 0.01 10*3/MM3 (ref 0–0.4)
EOSINOPHIL # BLD AUTO: 0.04 10*3/MM3 (ref 0–0.4)
EOSINOPHIL # BLD AUTO: 0.1 X10E3/UL (ref 0–0.4)
EOSINOPHIL # BLD AUTO: 0.11 10*3/MM3 (ref 0–0.4)
EOSINOPHIL # BLD AUTO: 0.16 10*3/MM3 (ref 0–0.4)
EOSINOPHIL # BLD AUTO: 0.2 10*3/MM3 (ref 0–0.4)
EOSINOPHIL # BLD AUTO: 0.21 10*3/MM3 (ref 0–0.4)
EOSINOPHIL # BLD AUTO: 1 %
EOSINOPHIL NFR BLD AUTO: 0 % (ref 0.3–6.2)
EOSINOPHIL NFR BLD AUTO: 0.1 % (ref 0.3–6.2)
EOSINOPHIL NFR BLD AUTO: 0.2 % (ref 0.3–6.2)
EOSINOPHIL NFR BLD AUTO: 1 % (ref 0.3–6.2)
EOSINOPHIL NFR BLD AUTO: 1.3 % (ref 0.3–6.2)
EOSINOPHIL NFR BLD AUTO: 1.5 % (ref 0.3–6.2)
EOSINOPHIL NFR BLD AUTO: 1.8 % (ref 0.3–6.2)
EOSINOPHIL NFR BLD AUTO: 1.9 % (ref 0.3–6.2)
EOSINOPHIL NFR BLD AUTO: 14.3 % (ref 0.3–6.2)
EOSINOPHIL NFR BLD AUTO: ABNORMAL %
ERYTHROCYTE [DISTWIDTH] IN BLOOD BY AUTOMATED COUNT: 13.3 % (ref 12.3–15.4)
ERYTHROCYTE [DISTWIDTH] IN BLOOD BY AUTOMATED COUNT: 13.4 % (ref 12.3–15.4)
ERYTHROCYTE [DISTWIDTH] IN BLOOD BY AUTOMATED COUNT: 13.5 % (ref 12.3–15.4)
ERYTHROCYTE [DISTWIDTH] IN BLOOD BY AUTOMATED COUNT: 13.5 % (ref 12.3–15.4)
ERYTHROCYTE [DISTWIDTH] IN BLOOD BY AUTOMATED COUNT: 13.6 % (ref 12.3–15.4)
ERYTHROCYTE [DISTWIDTH] IN BLOOD BY AUTOMATED COUNT: 13.6 % (ref 12.3–15.4)
ERYTHROCYTE [DISTWIDTH] IN BLOOD BY AUTOMATED COUNT: 13.8 % (ref 12.3–15.4)
ERYTHROCYTE [DISTWIDTH] IN BLOOD BY AUTOMATED COUNT: 14 % (ref 12.3–15.4)
ERYTHROCYTE [DISTWIDTH] IN BLOOD BY AUTOMATED COUNT: 14.1 % (ref 12.3–15.4)
ERYTHROCYTE [DISTWIDTH] IN BLOOD BY AUTOMATED COUNT: 14.2 % (ref 12.3–15.4)
ERYTHROCYTE [DISTWIDTH] IN BLOOD BY AUTOMATED COUNT: 14.5 % (ref 12.3–15.4)
ERYTHROCYTE [DISTWIDTH] IN BLOOD BY AUTOMATED COUNT: 14.6 % (ref 12.3–15.4)
ERYTHROCYTE [DISTWIDTH] IN BLOOD BY AUTOMATED COUNT: 14.6 % (ref 12.3–15.4)
ERYTHROCYTE [DISTWIDTH] IN BLOOD BY AUTOMATED COUNT: 14.8 % (ref 12.3–15.4)
ERYTHROCYTE [DISTWIDTH] IN BLOOD BY AUTOMATED COUNT: 14.9 % (ref 12.3–15.4)
ERYTHROCYTE [DISTWIDTH] IN BLOOD BY AUTOMATED COUNT: 15 % (ref 12.3–15.4)
ERYTHROCYTE [DISTWIDTH] IN BLOOD BY AUTOMATED COUNT: 15.1 % (ref 12.3–15.4)
FLUAV H1 2009 PAND RNA NPH QL NAA+PROBE: NOT DETECTED
FLUAV H1 HA GENE NPH QL NAA+PROBE: NOT DETECTED
FLUAV H3 RNA NPH QL NAA+PROBE: NOT DETECTED
FLUAV SUBTYP SPEC NAA+PROBE: NOT DETECTED
FLUBV RNA ISLT QL NAA+PROBE: NOT DETECTED
GFR SERPL CREATININE-BSD FRML MDRD: 133 ML/MIN/1.73
GFR SERPL CREATININE-BSD FRML MDRD: >150 ML/MIN/1.73
GLOBULIN SER CALC-MCNC: 2.7 G/DL (ref 1.5–4.5)
GLOBULIN UR ELPH-MCNC: 2.4 GM/DL
GLOBULIN UR ELPH-MCNC: 2.6 GM/DL (ref 2.5–3.8)
GLOBULIN UR ELPH-MCNC: 2.7 GM/DL (ref 2.5–3.8)
GLOBULIN UR ELPH-MCNC: 2.9 GM/DL (ref 2.5–3.8)
GLOBULIN UR ELPH-MCNC: 2.9 GM/DL (ref 2.5–3.8)
GLOBULIN UR ELPH-MCNC: 3 GM/DL (ref 2.5–3.8)
GLOBULIN UR ELPH-MCNC: 3.1 GM/DL (ref 2.5–3.8)
GLOBULIN UR ELPH-MCNC: 3.4 GM/DL (ref 2.5–3.8)
GLOBULIN UR ELPH-MCNC: 3.7 GM/DL (ref 2.5–3.8)
GLUCOSE BLD-MCNC: 108 MG/DL (ref 65–99)
GLUCOSE BLD-MCNC: 128 MG/DL (ref 65–99)
GLUCOSE BLD-MCNC: 134 MG/DL (ref 65–99)
GLUCOSE BLD-MCNC: 139 MG/DL (ref 65–99)
GLUCOSE BLD-MCNC: 139 MG/DL (ref 65–99)
GLUCOSE BLD-MCNC: 140 MG/DL (ref 65–99)
GLUCOSE BLD-MCNC: 162 MG/DL (ref 65–99)
GLUCOSE BLD-MCNC: 172 MG/DL (ref 65–99)
GLUCOSE BLD-MCNC: 253 MG/DL (ref 65–99)
GLUCOSE BLD-MCNC: 285 MG/DL (ref 65–99)
GLUCOSE BLD-MCNC: 287 MG/DL (ref 65–99)
GLUCOSE BLD-MCNC: 76 MG/DL (ref 65–99)
GLUCOSE BLD-MCNC: 95 MG/DL (ref 65–99)
GLUCOSE BLDC GLUCOMTR-MCNC: 166 MG/DL (ref 70–105)
GLUCOSE BLDC GLUCOMTR-MCNC: 177 MG/DL (ref 74–100)
GLUCOSE BLDC GLUCOMTR-MCNC: 207 MG/DL (ref 70–105)
GLUCOSE BLDC GLUCOMTR-MCNC: 209 MG/DL (ref 70–105)
GLUCOSE BLDC GLUCOMTR-MCNC: 87 MG/DL (ref 70–105)
GLUCOSE SERPL-MCNC: 233 MG/DL (ref 65–99)
HADV DNA SPEC NAA+PROBE: NOT DETECTED
HCO3 BLDA-SCNC: 27.3 MMOL/L (ref 21–28)
HCO3 BLDA-SCNC: 29.3 MMOL/L (ref 21–28)
HCO3 BLDA-SCNC: 29.6 MMOL/L (ref 21–28)
HCOV 229E RNA SPEC QL NAA+PROBE: NOT DETECTED
HCOV HKU1 RNA SPEC QL NAA+PROBE: NOT DETECTED
HCOV NL63 RNA SPEC QL NAA+PROBE: NOT DETECTED
HCOV OC43 RNA SPEC QL NAA+PROBE: NOT DETECTED
HCT VFR BLD AUTO: 23.7 % (ref 37.5–51)
HCT VFR BLD AUTO: 24.2 % (ref 37.5–51)
HCT VFR BLD AUTO: 26.3 % (ref 37.5–51)
HCT VFR BLD AUTO: 26.8 % (ref 37.5–51)
HCT VFR BLD AUTO: 27.5 % (ref 37.5–51)
HCT VFR BLD AUTO: 27.6 % (ref 37.5–51)
HCT VFR BLD AUTO: 28.1 % (ref 37.5–51)
HCT VFR BLD AUTO: 29.4 % (ref 37.5–51)
HCT VFR BLD AUTO: 29.7 % (ref 37.5–51)
HCT VFR BLD AUTO: 30.9 % (ref 37.5–51)
HCT VFR BLD AUTO: 31.5 % (ref 37.5–51)
HCT VFR BLD AUTO: 33.4 % (ref 37.5–51)
HCT VFR BLD AUTO: 33.7 % (ref 37.5–51)
HCT VFR BLD AUTO: 35.4 % (ref 37.5–51)
HCT VFR BLD AUTO: 37.3 % (ref 37.5–51)
HCT VFR BLD AUTO: 37.7 % (ref 37.5–51)
HCT VFR BLD AUTO: 39.3 % (ref 37.5–51)
HCT VFR BLD AUTO: 40.1 % (ref 37.5–51)
HCT VFR BLD AUTO: 40.2 % (ref 37.5–51)
HCT VFR BLD AUTO: 41.9 % (ref 37.5–51)
HCT VFR BLD AUTO: 44.1 % (ref 37.5–51)
HCT VFR BLD AUTO: 44.4 % (ref 37.5–51)
HCT VFR BLDA CALC: 26 % (ref 38–51)
HEMODILUTION: NO
HGB BLD-MCNC: 10.3 G/DL (ref 13–17.7)
HGB BLD-MCNC: 10.6 G/DL (ref 13–17.7)
HGB BLD-MCNC: 10.8 G/DL (ref 13–17.7)
HGB BLD-MCNC: 11.1 G/DL (ref 13–17.7)
HGB BLD-MCNC: 11.2 G/DL (ref 13–17.7)
HGB BLD-MCNC: 11.8 G/DL (ref 13–17.7)
HGB BLD-MCNC: 12.1 G/DL (ref 13–17.7)
HGB BLD-MCNC: 12.1 G/DL (ref 13–17.7)
HGB BLD-MCNC: 12.7 G/DL (ref 13–17.7)
HGB BLD-MCNC: 13.1 G/DL (ref 13–17.7)
HGB BLD-MCNC: 13.6 G/DL (ref 13–17.7)
HGB BLD-MCNC: 14.2 G/DL (ref 13–17.7)
HGB BLD-MCNC: 14.4 G/DL (ref 13–17.7)
HGB BLD-MCNC: 8.2 G/DL (ref 13–17.7)
HGB BLD-MCNC: 8.6 G/DL (ref 13–17.7)
HGB BLD-MCNC: 8.9 G/DL (ref 13–17.7)
HGB BLD-MCNC: 9.3 G/DL (ref 13–17.7)
HGB BLD-MCNC: 9.3 G/DL (ref 13–17.7)
HGB BLD-MCNC: 9.4 G/DL (ref 13–17.7)
HGB BLD-MCNC: 9.5 G/DL (ref 13–17.7)
HGB BLD-MCNC: 9.7 G/DL (ref 13–17.7)
HGB BLD-MCNC: 9.9 G/DL (ref 13–17.7)
HGB BLDA-MCNC: 8.9 G/DL (ref 12–17)
HMPV RNA NPH QL NAA+NON-PROBE: NOT DETECTED
HOROWITZ INDEX BLD+IHG-RTO: 36 %
HOROWITZ INDEX BLD+IHG-RTO: <21 %
HOROWITZ INDEX BLD+IHG-RTO: <21 %
HPIV1 RNA SPEC QL NAA+PROBE: NOT DETECTED
HPIV2 RNA SPEC QL NAA+PROBE: NOT DETECTED
HPIV3 RNA NPH QL NAA+PROBE: NOT DETECTED
HPIV4 P GENE NPH QL NAA+PROBE: NOT DETECTED
IMM GRANULOCYTES # BLD: 0 X10E3/UL (ref 0–0.1)
IMM GRANULOCYTES NFR BLD: 0 %
INR PPP: 1.11 (ref 0.9–1.1)
INR PPP: 1.23 (ref 0.9–1.1)
L PNEUMO1 AG UR QL IA: NEGATIVE
LAB AP CASE REPORT: NORMAL
LAB AP DIAGNOSIS COMMENT: NORMAL
LYMPHOCYTES # BLD AUTO: 0 10*3/MM3 (ref 0.7–3.1)
LYMPHOCYTES # BLD AUTO: 0.04 10*3/MM3 (ref 0.7–3.1)
LYMPHOCYTES # BLD AUTO: 0.09 10*3/MM3 (ref 0.7–3.1)
LYMPHOCYTES # BLD AUTO: 0.14 10*3/MM3 (ref 0.7–3.1)
LYMPHOCYTES # BLD AUTO: 0.15 10*3/MM3 (ref 0.7–3.1)
LYMPHOCYTES # BLD AUTO: 0.19 10*3/MM3 (ref 0.7–3.1)
LYMPHOCYTES # BLD AUTO: 0.27 10*3/MM3 (ref 0.7–3.1)
LYMPHOCYTES # BLD AUTO: 0.4 10*3/MM3 (ref 0.7–3.1)
LYMPHOCYTES # BLD AUTO: 0.5 10*3/MM3 (ref 0.7–3.1)
LYMPHOCYTES # BLD AUTO: 0.61 10*3/MM3 (ref 0.7–3.1)
LYMPHOCYTES # BLD AUTO: 0.7 10*3/MM3 (ref 0.7–3.1)
LYMPHOCYTES # BLD AUTO: 0.72 10*3/MM3 (ref 0.7–3.1)
LYMPHOCYTES # BLD AUTO: 0.8 X10E3/UL (ref 0.7–3.1)
LYMPHOCYTES # BLD AUTO: 0.9 10*3/MM3 (ref 0.7–3.1)
LYMPHOCYTES NFR BLD AUTO: 1.6 % (ref 19.6–45.3)
LYMPHOCYTES NFR BLD AUTO: 10 % (ref 19.6–45.3)
LYMPHOCYTES NFR BLD AUTO: 2.5 % (ref 19.6–45.3)
LYMPHOCYTES NFR BLD AUTO: 2.8 % (ref 19.6–45.3)
LYMPHOCYTES NFR BLD AUTO: 2.8 % (ref 19.6–45.3)
LYMPHOCYTES NFR BLD AUTO: 3.2 % (ref 19.6–45.3)
LYMPHOCYTES NFR BLD AUTO: 3.3 % (ref 19.6–45.3)
LYMPHOCYTES NFR BLD AUTO: 39.9 % (ref 19.6–45.3)
LYMPHOCYTES NFR BLD AUTO: 4.4 % (ref 19.6–45.3)
LYMPHOCYTES NFR BLD AUTO: 5.3 % (ref 19.6–45.3)
LYMPHOCYTES NFR BLD AUTO: 5.4 % (ref 19.6–45.3)
LYMPHOCYTES NFR BLD AUTO: 6.7 % (ref 19.6–45.3)
LYMPHOCYTES NFR BLD AUTO: 7.3 % (ref 19.6–45.3)
LYMPHOCYTES NFR BLD AUTO: 8 %
LYMPHOCYTES NFR BLD AUTO: ABNORMAL %
Lab: NORMAL
M PNEUMO IGG SER IA-ACNC: NOT DETECTED
MAGNESIUM SERPL-MCNC: 1.2 MG/DL (ref 1.6–2.4)
MAGNESIUM SERPL-MCNC: 1.4 MG/DL (ref 1.6–2.4)
MAGNESIUM SERPL-MCNC: 1.5 MG/DL (ref 1.6–2.4)
MAGNESIUM SERPL-MCNC: 1.5 MG/DL (ref 1.8–2.5)
MAGNESIUM SERPL-MCNC: 1.6 MG/DL (ref 1.8–2.5)
MAGNESIUM SERPL-MCNC: 1.8 MG/DL (ref 1.8–2.5)
MAGNESIUM SERPL-MCNC: 1.8 MG/DL (ref 1.8–2.5)
MAGNESIUM SERPL-MCNC: 1.9 MG/DL (ref 1.8–2.5)
MCH RBC QN AUTO: 27.1 PG (ref 26.6–33)
MCH RBC QN AUTO: 27.1 PG (ref 26.6–33)
MCH RBC QN AUTO: 27.2 PG (ref 26.6–33)
MCH RBC QN AUTO: 27.8 PG (ref 26.6–33)
MCH RBC QN AUTO: 27.8 PG (ref 26.6–33)
MCH RBC QN AUTO: 27.9 PG (ref 26.6–33)
MCH RBC QN AUTO: 27.9 PG (ref 26.6–33)
MCH RBC QN AUTO: 28 PG (ref 26.6–33)
MCH RBC QN AUTO: 28.1 PG (ref 26.6–33)
MCH RBC QN AUTO: 28.2 PG (ref 26.6–33)
MCH RBC QN AUTO: 28.3 PG (ref 26.6–33)
MCH RBC QN AUTO: 28.4 PG (ref 26.6–33)
MCH RBC QN AUTO: 28.5 PG (ref 26.6–33)
MCH RBC QN AUTO: 28.6 PG (ref 26.6–33)
MCH RBC QN AUTO: 28.8 PG (ref 26.6–33)
MCH RBC QN AUTO: 29.5 PG (ref 26.6–33)
MCHC RBC AUTO-ENTMCNC: 30.8 G/DL (ref 31.5–35.7)
MCHC RBC AUTO-ENTMCNC: 31.3 G/DL (ref 31.5–35.7)
MCHC RBC AUTO-ENTMCNC: 31.6 G/DL (ref 31.5–35.7)
MCHC RBC AUTO-ENTMCNC: 31.6 G/DL (ref 31.5–35.7)
MCHC RBC AUTO-ENTMCNC: 32 G/DL (ref 31.5–35.7)
MCHC RBC AUTO-ENTMCNC: 32.3 G/DL (ref 31.5–35.7)
MCHC RBC AUTO-ENTMCNC: 32.4 G/DL (ref 31.5–35.7)
MCHC RBC AUTO-ENTMCNC: 32.5 G/DL (ref 31.5–35.7)
MCHC RBC AUTO-ENTMCNC: 32.6 G/DL (ref 31.5–35.7)
MCHC RBC AUTO-ENTMCNC: 32.7 G/DL (ref 31.5–35.7)
MCHC RBC AUTO-ENTMCNC: 32.9 G/DL (ref 31.5–35.7)
MCHC RBC AUTO-ENTMCNC: 33.7 G/DL (ref 31.5–35.7)
MCHC RBC AUTO-ENTMCNC: 33.7 G/DL (ref 31.5–35.7)
MCHC RBC AUTO-ENTMCNC: 33.8 G/DL (ref 31.5–35.7)
MCHC RBC AUTO-ENTMCNC: 33.8 G/DL (ref 31.5–35.7)
MCHC RBC AUTO-ENTMCNC: 33.9 G/DL (ref 31.5–35.7)
MCHC RBC AUTO-ENTMCNC: 34.3 G/DL (ref 31.5–35.7)
MCHC RBC AUTO-ENTMCNC: 34.6 G/DL (ref 31.5–35.7)
MCHC RBC AUTO-ENTMCNC: 35 G/DL (ref 31.5–35.7)
MCHC RBC AUTO-ENTMCNC: 35.7 G/DL (ref 31.5–35.7)
MCV RBC AUTO: 81.2 FL (ref 79–97)
MCV RBC AUTO: 82.2 FL (ref 79–97)
MCV RBC AUTO: 82.8 FL (ref 79–97)
MCV RBC AUTO: 82.8 FL (ref 79–97)
MCV RBC AUTO: 83.2 FL (ref 79–97)
MCV RBC AUTO: 83.6 FL (ref 79–97)
MCV RBC AUTO: 84 FL (ref 79–97)
MCV RBC AUTO: 84.1 FL (ref 79–97)
MCV RBC AUTO: 84.6 FL (ref 79–97)
MCV RBC AUTO: 85.6 FL (ref 79–97)
MCV RBC AUTO: 85.6 FL (ref 79–97)
MCV RBC AUTO: 85.8 FL (ref 79–97)
MCV RBC AUTO: 85.9 FL (ref 79–97)
MCV RBC AUTO: 85.9 FL (ref 79–97)
MCV RBC AUTO: 86 FL (ref 79–97)
MCV RBC AUTO: 86.1 FL (ref 79–97)
MCV RBC AUTO: 86.2 FL (ref 79–97)
MCV RBC AUTO: 86.4 FL (ref 79–97)
MCV RBC AUTO: 86.7 FL (ref 79–97)
MCV RBC AUTO: 88.1 FL (ref 79–97)
MCV RBC AUTO: 88.4 FL (ref 79–97)
MCV RBC AUTO: 89 FL (ref 79–97)
MODALITY: ABNORMAL
MONOCYTES # BLD AUTO: 0 10*3/MM3 (ref 0.1–0.9)
MONOCYTES # BLD AUTO: 0.01 10*3/MM3 (ref 0.1–0.9)
MONOCYTES # BLD AUTO: 0.15 10*3/MM3 (ref 0.1–0.9)
MONOCYTES # BLD AUTO: 0.28 10*3/MM3 (ref 0.1–0.9)
MONOCYTES # BLD AUTO: 0.3 10*3/MM3 (ref 0.1–0.9)
MONOCYTES # BLD AUTO: 0.33 10*3/MM3 (ref 0.1–0.9)
MONOCYTES # BLD AUTO: 0.48 10*3/MM3 (ref 0.1–0.9)
MONOCYTES # BLD AUTO: 0.7 10*3/MM3 (ref 0.1–0.9)
MONOCYTES # BLD AUTO: 0.78 10*3/MM3 (ref 0.1–0.9)
MONOCYTES # BLD AUTO: 1 X10E3/UL (ref 0.1–0.9)
MONOCYTES # BLD AUTO: 1.09 10*3/MM3 (ref 0.1–0.9)
MONOCYTES # BLD AUTO: 1.17 10*3/MM3 (ref 0.1–0.9)
MONOCYTES # BLD AUTO: 1.2 10*3/MM3 (ref 0.1–0.9)
MONOCYTES # BLD AUTO: 1.4 10*3/MM3 (ref 0.1–0.9)
MONOCYTES NFR BLD AUTO: 0.7 % (ref 5–12)
MONOCYTES NFR BLD AUTO: 1.7 % (ref 5–12)
MONOCYTES NFR BLD AUTO: 1.8 % (ref 5–12)
MONOCYTES NFR BLD AUTO: 10 %
MONOCYTES NFR BLD AUTO: 10.4 % (ref 5–12)
MONOCYTES NFR BLD AUTO: 10.9 % (ref 5–12)
MONOCYTES NFR BLD AUTO: 11.9 % (ref 5–12)
MONOCYTES NFR BLD AUTO: 12.3 % (ref 5–12)
MONOCYTES NFR BLD AUTO: 17.8 % (ref 5–12)
MONOCYTES NFR BLD AUTO: 22.9 % (ref 5–12)
MONOCYTES NFR BLD AUTO: 5.9 % (ref 5–12)
MONOCYTES NFR BLD AUTO: 7.5 % (ref 5–12)
MONOCYTES NFR BLD AUTO: 9.7 % (ref 5–12)
MONOCYTES NFR BLD AUTO: 9.8 % (ref 5–12)
MONOCYTES NFR BLD AUTO: ABNORMAL %
MRSA SPEC QL CULT: NORMAL
MRSA SPEC QL CULT: NORMAL
NEUTROPHILS # BLD AUTO: 0 10*3/MM3 (ref 1.7–7)
NEUTROPHILS # BLD AUTO: 1.36 10*3/MM3 (ref 1.7–7)
NEUTROPHILS # BLD AUTO: 10.1 10*3/MM3 (ref 1.7–7)
NEUTROPHILS # BLD AUTO: 14.75 10*3/MM3 (ref 1.7–7)
NEUTROPHILS # BLD AUTO: 2.03 10*3/MM3 (ref 1.7–7)
NEUTROPHILS # BLD AUTO: 2.11 10*3/MM3 (ref 1.7–7)
NEUTROPHILS # BLD AUTO: 4.62 10*3/MM3 (ref 1.7–7)
NEUTROPHILS # BLD AUTO: 6.5 10*3/MM3 (ref 1.7–7)
NEUTROPHILS # BLD AUTO: 7.9 X10E3/UL (ref 1.4–7)
NEUTROPHILS # BLD AUTO: 8.3 10*3/MM3 (ref 1.7–7)
NEUTROPHILS # BLD AUTO: 8.49 10*3/MM3 (ref 1.7–7)
NEUTROPHILS # BLD AUTO: 8.58 10*3/MM3 (ref 1.7–7)
NEUTROPHILS # BLD AUTO: 9.38 10*3/MM3 (ref 1.7–7)
NEUTROPHILS # BLD AUTO: 9.4 10*3/MM3 (ref 1.7–7)
NEUTROPHILS NFR BLD AUTO: 22.9 % (ref 42.7–76)
NEUTROPHILS NFR BLD AUTO: 75.5 % (ref 42.7–76)
NEUTROPHILS NFR BLD AUTO: 78.5 % (ref 42.7–76)
NEUTROPHILS NFR BLD AUTO: 78.8 % (ref 42.7–76)
NEUTROPHILS NFR BLD AUTO: 80.1 % (ref 42.7–76)
NEUTROPHILS NFR BLD AUTO: 81 %
NEUTROPHILS NFR BLD AUTO: 82.3 % (ref 42.7–76)
NEUTROPHILS NFR BLD AUTO: 83.5 % (ref 42.7–76)
NEUTROPHILS NFR BLD AUTO: 86.9 % (ref 42.7–76)
NEUTROPHILS NFR BLD AUTO: 90.7 % (ref 42.7–76)
NEUTROPHILS NFR BLD AUTO: 91.1 % (ref 42.7–76)
NEUTROPHILS NFR BLD AUTO: 92 % (ref 42.7–76)
NEUTROPHILS NFR BLD AUTO: 93.6 % (ref 42.7–76)
NEUTROPHILS NFR BLD AUTO: 96.5 % (ref 42.7–76)
NEUTROPHILS NFR BLD AUTO: ABNORMAL %
NRBC BLD AUTO-RTO: 0 /100 WBC (ref 0–0.2)
NRBC BLD AUTO-RTO: 0 /100 WBC (ref 0–0.2)
NRBC BLD AUTO-RTO: 0.2 /100 WBC (ref 0–0.2)
NSE SERPL IA-MCNC: 13.2 NG/ML (ref 0–12.5)
OSMOLALITY SERPL: 259 MOSM/KG (ref 280–300)
OSMOLALITY UR: 520 MOSM/KG (ref 300–800)
PATH REPORT.FINAL DX SPEC: NORMAL
PATH REPORT.GROSS SPEC: NORMAL
PCO2 BLDA: 35.2 MM HG (ref 35–48)
PCO2 BLDA: 35.8 MM HG (ref 35–48)
PCO2 BLDA: 41.5 MM HG (ref 35–48)
PH BLDA: 7.46 PH UNITS (ref 7.35–7.45)
PH BLDA: 7.49 PH UNITS (ref 7.35–7.45)
PH BLDA: 7.53 PH UNITS (ref 7.35–7.45)
PHOSPHATE SERPL-MCNC: 2.7 MG/DL (ref 2.5–4.5)
PLATELET # BLD AUTO: 102 10*3/MM3 (ref 140–450)
PLATELET # BLD AUTO: 17 10*3/MM3 (ref 140–450)
PLATELET # BLD AUTO: 170 10*3/MM3 (ref 140–450)
PLATELET # BLD AUTO: 18 10*3/MM3 (ref 140–450)
PLATELET # BLD AUTO: 197 10*3/MM3 (ref 140–450)
PLATELET # BLD AUTO: 209 10*3/MM3 (ref 140–450)
PLATELET # BLD AUTO: 254 10*3/MM3 (ref 140–450)
PLATELET # BLD AUTO: 27 10*3/MM3 (ref 140–450)
PLATELET # BLD AUTO: 310 10*3/MM3 (ref 140–450)
PLATELET # BLD AUTO: 350 10*3/MM3 (ref 140–450)
PLATELET # BLD AUTO: 366 10*3/MM3 (ref 140–450)
PLATELET # BLD AUTO: 387 10*3/MM3 (ref 140–450)
PLATELET # BLD AUTO: 39 10*3/MM3 (ref 140–450)
PLATELET # BLD AUTO: 390 10*3/MM3 (ref 140–450)
PLATELET # BLD AUTO: 413 X10E3/UL (ref 150–450)
PLATELET # BLD AUTO: 442 10*3/MM3 (ref 140–450)
PLATELET # BLD AUTO: 45 10*3/MM3 (ref 140–450)
PLATELET # BLD AUTO: 49 10*3/MM3 (ref 140–450)
PLATELET # BLD AUTO: 49 10*3/MM3 (ref 140–450)
PLATELET # BLD AUTO: 50 10*3/MM3 (ref 140–450)
PLATELET # BLD AUTO: 55 10*3/MM3 (ref 140–450)
PLATELET # BLD AUTO: 69 10*3/MM3 (ref 140–450)
PMV BLD AUTO: 10 FL (ref 6–12)
PMV BLD AUTO: 10.1 FL (ref 6–12)
PMV BLD AUTO: 10.2 FL (ref 6–12)
PMV BLD AUTO: 10.2 FL (ref 6–12)
PMV BLD AUTO: 10.3 FL (ref 6–12)
PMV BLD AUTO: 10.4 FL (ref 6–12)
PMV BLD AUTO: 10.6 FL (ref 6–12)
PMV BLD AUTO: 11.4 FL (ref 6–12)
PMV BLD AUTO: 11.6 FL (ref 6–12)
PMV BLD AUTO: 11.7 FL (ref 6–12)
PMV BLD AUTO: 8 FL (ref 6–12)
PMV BLD AUTO: 8.3 FL (ref 6–12)
PMV BLD AUTO: 8.8 FL (ref 6–12)
PMV BLD AUTO: 9 FL (ref 6–12)
PMV BLD AUTO: 9.4 FL (ref 6–12)
PMV BLD AUTO: 9.5 FL (ref 6–12)
PMV BLD AUTO: 9.8 FL (ref 6–12)
PMV BLD AUTO: 9.9 FL (ref 6–12)
PO2 BLDA: 70.5 MM HG (ref 83–108)
PO2 BLDA: 72.2 MM HG (ref 83–108)
PO2 BLDA: 82.6 MM HG (ref 83–108)
POTASSIUM BLD-SCNC: 3.1 MMOL/L (ref 3.5–5.2)
POTASSIUM BLD-SCNC: 3.2 MMOL/L (ref 3.5–5.2)
POTASSIUM BLD-SCNC: 3.2 MMOL/L (ref 3.5–5.2)
POTASSIUM BLD-SCNC: 3.8 MMOL/L (ref 3.6–5.1)
POTASSIUM BLD-SCNC: 4 MMOL/L (ref 3.5–5.2)
POTASSIUM BLD-SCNC: 4 MMOL/L (ref 3.6–5.1)
POTASSIUM BLD-SCNC: 4.2 MMOL/L (ref 3.6–5.1)
POTASSIUM BLD-SCNC: 4.5 MMOL/L (ref 3.5–5.2)
POTASSIUM BLD-SCNC: 4.5 MMOL/L (ref 3.6–5.1)
POTASSIUM BLD-SCNC: 5.1 MMOL/L (ref 3.6–5.1)
POTASSIUM BLDA-SCNC: 4.2 MMOL/L (ref 3.5–4.5)
POTASSIUM SERPL-SCNC: 4.3 MMOL/L (ref 3.5–5.2)
PROT SERPL-MCNC: 5.1 G/DL (ref 6.1–7.9)
PROT SERPL-MCNC: 5.2 G/DL (ref 6.1–7.9)
PROT SERPL-MCNC: 5.3 G/DL (ref 6–8.5)
PROT SERPL-MCNC: 5.6 G/DL (ref 6.1–7.9)
PROT SERPL-MCNC: 5.7 G/DL (ref 6.1–7.9)
PROT SERPL-MCNC: 5.7 G/DL (ref 6.1–7.9)
PROT SERPL-MCNC: 6 G/DL (ref 6.1–7.9)
PROT SERPL-MCNC: 6.3 G/DL (ref 6.1–7.9)
PROT SERPL-MCNC: 6.5 G/DL (ref 6–8.5)
PROT SERPL-MCNC: 7.1 G/DL (ref 6.1–7.9)
PROTHROMBIN TIME: 11.2 SECONDS (ref 9.6–11.7)
PROTHROMBIN TIME: 12.3 SECONDS (ref 9.6–11.7)
RBC # BLD AUTO: 2.92 10*6/MM3 (ref 4.14–5.8)
RBC # BLD AUTO: 2.92 10*6/MM3 (ref 4.14–5.8)
RBC # BLD AUTO: 3.16 10*6/MM3 (ref 4.14–5.8)
RBC # BLD AUTO: 3.26 10*6/MM3 (ref 4.14–5.8)
RBC # BLD AUTO: 3.27 10*6/MM3 (ref 4.14–5.8)
RBC # BLD AUTO: 3.3 10*6/MM3 (ref 4.14–5.8)
RBC # BLD AUTO: 3.32 10*6/MM3 (ref 4.14–5.8)
RBC # BLD AUTO: 3.47 10*6/MM3 (ref 4.14–5.8)
RBC # BLD AUTO: 3.5 10*6/MM3 (ref 4.14–5.8)
RBC # BLD AUTO: 3.68 10*6/MM3 (ref 4.14–5.8)
RBC # BLD AUTO: 3.74 10*6/MM3 (ref 4.14–5.8)
RBC # BLD AUTO: 3.88 10*6/MM3 (ref 4.14–5.8)
RBC # BLD AUTO: 3.91 10*6/MM3 (ref 4.14–5.8)
RBC # BLD AUTO: 4.12 10*6/MM3 (ref 4.14–5.8)
RBC # BLD AUTO: 4.34 10*6/MM3 (ref 4.14–5.8)
RBC # BLD AUTO: 4.35 10*6/MM3 (ref 4.14–5.8)
RBC # BLD AUTO: 4.46 10*6/MM3 (ref 4.14–5.8)
RBC # BLD AUTO: 4.55 10*6/MM3 (ref 4.14–5.8)
RBC # BLD AUTO: 4.68 10*6/MM3 (ref 4.14–5.8)
RBC # BLD AUTO: 4.89 X10E6/UL (ref 4.14–5.8)
RBC # BLD AUTO: 4.99 10*6/MM3 (ref 4.14–5.8)
RBC # BLD AUTO: 5.11 10*6/MM3 (ref 4.14–5.8)
REF LAB TEST RESULTS: NORMAL
RH BLD: POSITIVE
RH BLD: POSITIVE
RHINOVIRUS RNA SPEC NAA+PROBE: DETECTED
RSV RNA NPH QL NAA+NON-PROBE: NOT DETECTED
S PNEUM AG SPEC QL LA: NEGATIVE
SAO2 % BLDCOA: 94.7 % (ref 94–98)
SAO2 % BLDCOA: 95.6 % (ref 94–98)
SAO2 % BLDCOA: 97.3 % (ref 94–98)
SODIUM BLD-SCNC: 118 MMOL/L (ref 136–145)
SODIUM BLD-SCNC: 120 MMOL/L (ref 136–145)
SODIUM BLD-SCNC: 120 MMOL/L (ref 136–145)
SODIUM BLD-SCNC: 122 MMOL/L (ref 136–145)
SODIUM BLD-SCNC: 129 MMOL/L (ref 136–144)
SODIUM BLD-SCNC: 129 MMOL/L (ref 136–145)
SODIUM BLD-SCNC: 131 MMOL/L (ref 136–144)
SODIUM BLD-SCNC: 134 MMOL/L (ref 136–144)
SODIUM BLD-SCNC: 135 MMOL/L (ref 136–144)
SODIUM BLD-SCNC: 135 MMOL/L (ref 136–144)
SODIUM BLD-SCNC: 137 MMOL/L (ref 136–144)
SODIUM BLD-SCNC: 137 MMOL/L (ref 136–144)
SODIUM BLD-SCNC: 138 MMOL/L (ref 136–144)
SODIUM BLDA-SCNC: 114 MMOL/L (ref 138–146)
SODIUM SERPL-SCNC: 140 MMOL/L (ref 134–144)
SODIUM UR-SCNC: <20 MMOL/L
T&S EXPIRATION DATE: NORMAL
T4 FREE SERPL-MCNC: 1.27 NG/DL (ref 0.93–1.7)
TROPONIN T SERPL-MCNC: <0.01 NG/ML (ref 0–0.03)
TSH SERPL DL<=0.05 MIU/L-ACNC: 0.39 UIU/ML (ref 0.27–4.2)
UNIT  ABO: NORMAL
UNIT  RH: NORMAL
WBC # BLD AUTO: 9.7 X10E3/UL (ref 3.4–10.8)
WBC NRBC COR # BLD: 0.04 10*3/MM3 (ref 3.4–10.8)
WBC NRBC COR # BLD: 0.05 10*3/MM3 (ref 3.4–10.8)
WBC NRBC COR # BLD: 0.05 10*3/MM3 (ref 3.4–10.8)
WBC NRBC COR # BLD: 0.06 10*3/MM3 (ref 3.4–10.8)
WBC NRBC COR # BLD: 0.1 10*3/MM3 (ref 3.4–10.8)
WBC NRBC COR # BLD: 0.2 10*3/MM3 (ref 3.4–10.8)
WBC NRBC COR # BLD: 1.41 10*3/MM3 (ref 3.4–10.8)
WBC NRBC COR # BLD: 10.71 10*3/MM3 (ref 3.4–10.8)
WBC NRBC COR # BLD: 11.23 10*3/MM3 (ref 3.4–10.8)
WBC NRBC COR # BLD: 11.9 10*3/MM3 (ref 3.4–10.8)
WBC NRBC COR # BLD: 12.2 10*3/MM3 (ref 3.4–10.8)
WBC NRBC COR # BLD: 15.75 10*3/MM3 (ref 3.4–10.8)
WBC NRBC COR # BLD: 2.69 10*3/MM3 (ref 3.4–10.8)
WBC NRBC COR # BLD: 2.69 10*3/MM3 (ref 3.4–10.8)
WBC NRBC COR # BLD: 5.07 10*3/MM3 (ref 3.4–10.8)
WBC NRBC COR # BLD: 7.49 10*3/MM3 (ref 3.4–10.8)
WBC NRBC COR # BLD: 9.02 10*3/MM3 (ref 3.4–10.8)
WBC NRBC COR # BLD: 9.36 10*3/MM3 (ref 3.4–10.8)
WBC NRBC COR # BLD: <0.2 10*3/MM3 (ref 3.4–10.8)

## 2019-01-01 PROCEDURE — 96372 THER/PROPH/DIAG INJ SC/IM: CPT | Performed by: INTERNAL MEDICINE

## 2019-01-01 PROCEDURE — 87070 CULTURE OTHR SPECIMN AEROBIC: CPT | Performed by: NURSE PRACTITIONER

## 2019-01-01 PROCEDURE — 88333 PATH CONSLTJ SURG CYTO XM 1: CPT | Performed by: NURSE PRACTITIONER

## 2019-01-01 PROCEDURE — 77300 RADIATION THERAPY DOSE PLAN: CPT | Performed by: RADIOLOGY

## 2019-01-01 PROCEDURE — 80048 BASIC METABOLIC PNL TOTAL CA: CPT | Performed by: HOSPITALIST

## 2019-01-01 PROCEDURE — 02HV33Z INSERTION OF INFUSION DEVICE INTO SUPERIOR VENA CAVA, PERCUTANEOUS APPROACH: ICD-10-PCS | Performed by: INTERNAL MEDICINE

## 2019-01-01 PROCEDURE — 99223 1ST HOSP IP/OBS HIGH 75: CPT | Performed by: INTERNAL MEDICINE

## 2019-01-01 PROCEDURE — 83605 ASSAY OF LACTIC ACID: CPT | Performed by: HOSPITALIST

## 2019-01-01 PROCEDURE — 25010000002 MAGNESIUM SULFATE PER 500 MG OF MAGNESIUM: Performed by: INTERNAL MEDICINE

## 2019-01-01 PROCEDURE — 84484 ASSAY OF TROPONIN QUANT: CPT | Performed by: HOSPITALIST

## 2019-01-01 PROCEDURE — 25010000002 ETOPOSIDE 1 GM/50ML SOLUTION 50 ML VIAL: Performed by: INTERNAL MEDICINE

## 2019-01-01 PROCEDURE — 25010000002 DEXAMETHASONE SODIUM PHOSPHATE 120 MG/30ML SOLUTION: Performed by: NURSE PRACTITIONER

## 2019-01-01 PROCEDURE — 96372 THER/PROPH/DIAG INJ SC/IM: CPT | Performed by: NURSE PRACTITIONER

## 2019-01-01 PROCEDURE — 96367 TX/PROPH/DG ADDL SEQ IV INF: CPT | Performed by: INTERNAL MEDICINE

## 2019-01-01 PROCEDURE — 96365 THER/PROPH/DIAG IV INF INIT: CPT

## 2019-01-01 PROCEDURE — 77386: CPT | Performed by: RADIOLOGY

## 2019-01-01 PROCEDURE — 25010000002 AMIODARONE PER 30 MG: Performed by: INTERNAL MEDICINE

## 2019-01-01 PROCEDURE — 25010000002 MAGNESIUM SULFATE PER 500 MG OF MAGNESIUM: Performed by: NURSE PRACTITIONER

## 2019-01-01 PROCEDURE — 77336 RADIATION PHYSICS CONSULT: CPT | Performed by: RADIOLOGY

## 2019-01-01 PROCEDURE — 93306 TTE W/DOPPLER COMPLETE: CPT

## 2019-01-01 PROCEDURE — 86900 BLOOD TYPING SEROLOGIC ABO: CPT

## 2019-01-01 PROCEDURE — 85025 COMPLETE CBC W/AUTO DIFF WBC: CPT | Performed by: INTERNAL MEDICINE

## 2019-01-01 PROCEDURE — 25010000002 CISPLATIN PER 50 MG: Performed by: NURSE PRACTITIONER

## 2019-01-01 PROCEDURE — 25010000002 PALONOSETRON 0.25 MG/5ML SOLUTION PREFILLED SYRINGE: Performed by: NURSE PRACTITIONER

## 2019-01-01 PROCEDURE — 25010000002 FENTANYL CITRATE (PF) 100 MCG/2ML SOLUTION

## 2019-01-01 PROCEDURE — 80053 COMPREHEN METABOLIC PANEL: CPT | Performed by: NURSE PRACTITIONER

## 2019-01-01 PROCEDURE — 71045 X-RAY EXAM CHEST 1 VIEW: CPT

## 2019-01-01 PROCEDURE — 82565 ASSAY OF CREATININE: CPT | Performed by: NURSE PRACTITIONER

## 2019-01-01 PROCEDURE — 83735 ASSAY OF MAGNESIUM: CPT | Performed by: NURSE PRACTITIONER

## 2019-01-01 PROCEDURE — 25010000002 ETOPOSIDE 1 GM/50ML SOLUTION 50 ML VIAL: Performed by: NURSE PRACTITIONER

## 2019-01-01 PROCEDURE — 77014: CPT | Performed by: RADIOLOGY

## 2019-01-01 PROCEDURE — 83735 ASSAY OF MAGNESIUM: CPT | Performed by: INTERNAL MEDICINE

## 2019-01-01 PROCEDURE — 25010000002 FOSAPREPITANT PER 1 MG: Performed by: INTERNAL MEDICINE

## 2019-01-01 PROCEDURE — 25010000002 PHENYLEPHRINE 10 MG/ML SOLUTION: Performed by: NURSE PRACTITIONER

## 2019-01-01 PROCEDURE — 25010000002 MORPHINE PER 10 MG: Performed by: NURSE PRACTITIONER

## 2019-01-01 PROCEDURE — 86900 BLOOD TYPING SEROLOGIC ABO: CPT | Performed by: INTERNAL MEDICINE

## 2019-01-01 PROCEDURE — 85025 COMPLETE CBC W/AUTO DIFF WBC: CPT | Performed by: NURSE PRACTITIONER

## 2019-01-01 PROCEDURE — 83605 ASSAY OF LACTIC ACID: CPT

## 2019-01-01 PROCEDURE — 96375 TX/PRO/DX INJ NEW DRUG ADDON: CPT | Performed by: INTERNAL MEDICINE

## 2019-01-01 PROCEDURE — 63710000001 INSULIN LISPRO (HUMAN) PER 5 UNITS: Performed by: NURSE PRACTITIONER

## 2019-01-01 PROCEDURE — 0 FLUDEOXYGLUCOSE F18 SOLUTION: Performed by: INTERNAL MEDICINE

## 2019-01-01 PROCEDURE — 77334 RADIATION TREATMENT AID(S): CPT | Performed by: RADIOLOGY

## 2019-01-01 PROCEDURE — 94799 UNLISTED PULMONARY SVC/PX: CPT

## 2019-01-01 PROCEDURE — 96413 CHEMO IV INFUSION 1 HR: CPT | Performed by: INTERNAL MEDICINE

## 2019-01-01 PROCEDURE — 25010000002 VANCOMYCIN 10 G RECONSTITUTED SOLUTION: Performed by: NURSE PRACTITIONER

## 2019-01-01 PROCEDURE — 25010000002 PALONOSETRON 0.25 MG/5ML SOLUTION PREFILLED SYRINGE: Performed by: INTERNAL MEDICINE

## 2019-01-01 PROCEDURE — A9552 F18 FDG: HCPCS | Performed by: INTERNAL MEDICINE

## 2019-01-01 PROCEDURE — 77293 RESPIRATOR MOTION MGMT SIMUL: CPT | Performed by: RADIOLOGY

## 2019-01-01 PROCEDURE — 99215 OFFICE O/P EST HI 40 MIN: CPT | Performed by: INTERNAL MEDICINE

## 2019-01-01 PROCEDURE — 83930 ASSAY OF BLOOD OSMOLALITY: CPT | Performed by: NURSE PRACTITIONER

## 2019-01-01 PROCEDURE — 82962 GLUCOSE BLOOD TEST: CPT

## 2019-01-01 PROCEDURE — 77001 FLUOROGUIDE FOR VEIN DEVICE: CPT

## 2019-01-01 PROCEDURE — 85610 PROTHROMBIN TIME: CPT | Performed by: RADIOLOGY

## 2019-01-01 PROCEDURE — 25010000002 CALCIUM GLUCONATE 2-0.675 GM/100ML-% SOLUTION: Performed by: NURSE PRACTITIONER

## 2019-01-01 PROCEDURE — 87081 CULTURE SCREEN ONLY: CPT | Performed by: INTERNAL MEDICINE

## 2019-01-01 PROCEDURE — 80051 ELECTROLYTE PANEL: CPT

## 2019-01-01 PROCEDURE — 86901 BLOOD TYPING SEROLOGIC RH(D): CPT | Performed by: INTERNAL MEDICINE

## 2019-01-01 PROCEDURE — 25010000002 CALCIUM GLUCONATE 2-0.675 GM/100ML-% SOLUTION: Performed by: INTERNAL MEDICINE

## 2019-01-01 PROCEDURE — 86850 RBC ANTIBODY SCREEN: CPT | Performed by: INTERNAL MEDICINE

## 2019-01-01 PROCEDURE — 25010000002 ONDANSETRON PER 1 MG: Performed by: RADIOLOGY

## 2019-01-01 PROCEDURE — 25010000002 MORPHINE PER 10 MG

## 2019-01-01 PROCEDURE — 25010000002 FUROSEMIDE PER 20 MG: Performed by: INTERNAL MEDICINE

## 2019-01-01 PROCEDURE — 87205 SMEAR GRAM STAIN: CPT | Performed by: NURSE PRACTITIONER

## 2019-01-01 PROCEDURE — 82803 BLOOD GASES ANY COMBINATION: CPT

## 2019-01-01 PROCEDURE — G0463 HOSPITAL OUTPT CLINIC VISIT: HCPCS

## 2019-01-01 PROCEDURE — 96417 CHEMO IV INFUS EACH ADDL SEQ: CPT | Performed by: INTERNAL MEDICINE

## 2019-01-01 PROCEDURE — 96366 THER/PROPH/DIAG IV INF ADDON: CPT | Performed by: INTERNAL MEDICINE

## 2019-01-01 PROCEDURE — 25010000002 DIGOXIN PER 500 MCG

## 2019-01-01 PROCEDURE — 88342 IMHCHEM/IMCYTCHM 1ST ANTB: CPT | Performed by: NURSE PRACTITIONER

## 2019-01-01 PROCEDURE — 82565 ASSAY OF CREATININE: CPT

## 2019-01-01 PROCEDURE — 70460 CT HEAD/BRAIN W/DYE: CPT

## 2019-01-01 PROCEDURE — 25010000002 FOSAPREPITANT PER 1 MG: Performed by: NURSE PRACTITIONER

## 2019-01-01 PROCEDURE — 80053 COMPREHEN METABOLIC PANEL: CPT | Performed by: INTERNAL MEDICINE

## 2019-01-01 PROCEDURE — 85018 HEMOGLOBIN: CPT

## 2019-01-01 PROCEDURE — 99152 MOD SED SAME PHYS/QHP 5/>YRS: CPT

## 2019-01-01 PROCEDURE — 36415 COLL VENOUS BLD VENIPUNCTURE: CPT

## 2019-01-01 PROCEDURE — 25010000002 DEXAMETHASONE SODIUM PHOSPHATE 120 MG/30ML SOLUTION: Performed by: INTERNAL MEDICINE

## 2019-01-01 PROCEDURE — 84100 ASSAY OF PHOSPHORUS: CPT | Performed by: HOSPITALIST

## 2019-01-01 PROCEDURE — 87040 BLOOD CULTURE FOR BACTERIA: CPT | Performed by: NURSE PRACTITIONER

## 2019-01-01 PROCEDURE — 86923 COMPATIBILITY TEST ELECTRIC: CPT

## 2019-01-01 PROCEDURE — 96360 HYDRATION IV INFUSION INIT: CPT | Performed by: INTERNAL MEDICINE

## 2019-01-01 PROCEDURE — 36600 WITHDRAWAL OF ARTERIAL BLOOD: CPT

## 2019-01-01 PROCEDURE — 85730 THROMBOPLASTIN TIME PARTIAL: CPT | Performed by: RADIOLOGY

## 2019-01-01 PROCEDURE — G0463 HOSPITAL OUTPT CLINIC VISIT: HCPCS | Performed by: RADIOLOGY

## 2019-01-01 PROCEDURE — 0 IOPAMIDOL PER 1 ML: Performed by: RADIOLOGY

## 2019-01-01 PROCEDURE — 25010000002 VANCOMYCIN 750 MG RECONSTITUTED SOLUTION 1 EACH VIAL: Performed by: NURSE PRACTITIONER

## 2019-01-01 PROCEDURE — 99205 OFFICE O/P NEW HI 60 MIN: CPT | Performed by: INTERNAL MEDICINE

## 2019-01-01 PROCEDURE — 80048 BASIC METABOLIC PNL TOTAL CA: CPT | Performed by: NURSE PRACTITIONER

## 2019-01-01 PROCEDURE — 76937 US GUIDE VASCULAR ACCESS: CPT

## 2019-01-01 PROCEDURE — 25010000002 POTASSIUM CHLORIDE PER 2 MEQ OF POTASSIUM: Performed by: INTERNAL MEDICINE

## 2019-01-01 PROCEDURE — 25010000002 FILGRASTIM 300 MCG/0.5ML SOLUTION PREFILLED SYRINGE: Performed by: NURSE PRACTITIONER

## 2019-01-01 PROCEDURE — 25010000002 POTASSIUM CHLORIDE PER 2 MEQ OF POTASSIUM: Performed by: NURSE PRACTITIONER

## 2019-01-01 PROCEDURE — 25010000002 FENTANYL CITRATE (PF) 100 MCG/2ML SOLUTION: Performed by: RADIOLOGY

## 2019-01-01 PROCEDURE — 25010000002 MIDAZOLAM PER 1 MG

## 2019-01-01 PROCEDURE — 77301 RADIOTHERAPY DOSE PLAN IMRT: CPT | Performed by: RADIOLOGY

## 2019-01-01 PROCEDURE — 93005 ELECTROCARDIOGRAM TRACING: CPT | Performed by: HOSPITALIST

## 2019-01-01 PROCEDURE — 77012 CT SCAN FOR NEEDLE BIOPSY: CPT

## 2019-01-01 PROCEDURE — 99153 MOD SED SAME PHYS/QHP EA: CPT

## 2019-01-01 PROCEDURE — 85025 COMPLETE CBC W/AUTO DIFF WBC: CPT

## 2019-01-01 PROCEDURE — 84300 ASSAY OF URINE SODIUM: CPT | Performed by: NURSE PRACTITIONER

## 2019-01-01 PROCEDURE — 25010000002 FILGRASTIM 300 MCG/0.5ML SOLUTION PREFILLED SYRINGE: Performed by: INTERNAL MEDICINE

## 2019-01-01 PROCEDURE — 77338 DESIGN MLC DEVICE FOR IMRT: CPT | Performed by: RADIOLOGY

## 2019-01-01 PROCEDURE — 96361 HYDRATE IV INFUSION ADD-ON: CPT | Performed by: INTERNAL MEDICINE

## 2019-01-01 PROCEDURE — 84443 ASSAY THYROID STIM HORMONE: CPT | Performed by: NURSE PRACTITIONER

## 2019-01-01 PROCEDURE — 94760 N-INVAS EAR/PLS OXIMETRY 1: CPT

## 2019-01-01 PROCEDURE — 85025 COMPLETE CBC W/AUTO DIFF WBC: CPT | Performed by: RADIOLOGY

## 2019-01-01 PROCEDURE — 5A2204Z RESTORATION OF CARDIAC RHYTHM, SINGLE: ICD-10-PCS | Performed by: INTERNAL MEDICINE

## 2019-01-01 PROCEDURE — 96374 THER/PROPH/DIAG INJ IV PUSH: CPT | Performed by: INTERNAL MEDICINE

## 2019-01-01 PROCEDURE — 99291 CRITICAL CARE FIRST HOUR: CPT | Performed by: INTERNAL MEDICINE

## 2019-01-01 PROCEDURE — C1788 PORT, INDWELLING, IMP: HCPCS

## 2019-01-01 PROCEDURE — 25010000002 CEFEPIME PER 500 MG: Performed by: NURSE PRACTITIONER

## 2019-01-01 PROCEDURE — 25010000002 MIDAZOLAM PER 1 MG: Performed by: RADIOLOGY

## 2019-01-01 PROCEDURE — 82330 ASSAY OF CALCIUM: CPT | Performed by: NURSE PRACTITIONER

## 2019-01-01 PROCEDURE — 36415 COLL VENOUS BLD VENIPUNCTURE: CPT | Performed by: INTERNAL MEDICINE

## 2019-01-01 PROCEDURE — 84439 ASSAY OF FREE THYROXINE: CPT | Performed by: NURSE PRACTITIONER

## 2019-01-01 PROCEDURE — P9016 RBC LEUKOCYTES REDUCED: HCPCS

## 2019-01-01 PROCEDURE — 0099U HC BIOFIRE FILMARRAY RESP PANEL 1: CPT | Performed by: NURSE PRACTITIONER

## 2019-01-01 PROCEDURE — 83935 ASSAY OF URINE OSMOLALITY: CPT | Performed by: NURSE PRACTITIONER

## 2019-01-01 PROCEDURE — 88305 TISSUE EXAM BY PATHOLOGIST: CPT | Performed by: NURSE PRACTITIONER

## 2019-01-01 PROCEDURE — 92960 CARDIOVERSION ELECTRIC EXT: CPT | Performed by: INTERNAL MEDICINE

## 2019-01-01 PROCEDURE — 78815 PET IMAGE W/CT SKULL-THIGH: CPT

## 2019-01-01 PROCEDURE — 82330 ASSAY OF CALCIUM: CPT | Performed by: HOSPITALIST

## 2019-01-01 PROCEDURE — B548ZZA ULTRASONOGRAPHY OF SUPERIOR VENA CAVA, GUIDANCE: ICD-10-PCS | Performed by: INTERNAL MEDICINE

## 2019-01-01 PROCEDURE — 36430 TRANSFUSION BLD/BLD COMPNT: CPT

## 2019-01-01 PROCEDURE — 71046 X-RAY EXAM CHEST 2 VIEWS: CPT

## 2019-01-01 PROCEDURE — 25010000002 ONDANSETRON PER 1 MG: Performed by: INTERNAL MEDICINE

## 2019-01-01 PROCEDURE — 96361 HYDRATE IV INFUSION ADD-ON: CPT | Performed by: NURSE PRACTITIONER

## 2019-01-01 PROCEDURE — 87899 AGENT NOS ASSAY W/OPTIC: CPT | Performed by: NURSE PRACTITIONER

## 2019-01-01 PROCEDURE — 99291 CRITICAL CARE FIRST HOUR: CPT | Performed by: HOSPITALIST

## 2019-01-01 PROCEDURE — 82330 ASSAY OF CALCIUM: CPT

## 2019-01-01 PROCEDURE — 94640 AIRWAY INHALATION TREATMENT: CPT

## 2019-01-01 PROCEDURE — 25010000002 CISPLATIN PER 50 MG: Performed by: INTERNAL MEDICINE

## 2019-01-01 PROCEDURE — 25010000002 AMIODARONE IN DEXTROSE 5% 150-4.21 MG/100ML-% SOLUTION: Performed by: INTERNAL MEDICINE

## 2019-01-01 PROCEDURE — 36593 DECLOT VASCULAR DEVICE: CPT | Performed by: NURSE PRACTITIONER

## 2019-01-01 PROCEDURE — P9035 PLATELET PHERES LEUKOREDUCED: HCPCS

## 2019-01-01 PROCEDURE — 88341 IMHCHEM/IMCYTCHM EA ADD ANTB: CPT | Performed by: NURSE PRACTITIONER

## 2019-01-01 PROCEDURE — 96360 HYDRATION IV INFUSION INIT: CPT | Performed by: NURSE PRACTITIONER

## 2019-01-01 PROCEDURE — C1894 INTRO/SHEATH, NON-LASER: HCPCS

## 2019-01-01 PROCEDURE — 93306 TTE W/DOPPLER COMPLETE: CPT | Performed by: INTERNAL MEDICINE

## 2019-01-01 PROCEDURE — 87081 CULTURE SCREEN ONLY: CPT | Performed by: RADIOLOGY

## 2019-01-01 PROCEDURE — 83735 ASSAY OF MAGNESIUM: CPT | Performed by: HOSPITALIST

## 2019-01-01 PROCEDURE — 85025 COMPLETE CBC W/AUTO DIFF WBC: CPT | Performed by: HOSPITALIST

## 2019-01-01 PROCEDURE — 88334 PATH CONSLTJ SURG CYTO XM EA: CPT | Performed by: NURSE PRACTITIONER

## 2019-01-01 RX ORDER — GLYCOPYRROLATE 0.2 MG/ML
0.1 INJECTION INTRAMUSCULAR; INTRAVENOUS EVERY 4 HOURS PRN
Status: DISCONTINUED | OUTPATIENT
Start: 2019-01-01 | End: 2019-01-01 | Stop reason: HOSPADM

## 2019-01-01 RX ORDER — SODIUM CHLORIDE 9 MG/ML
250 INJECTION, SOLUTION INTRAVENOUS ONCE
Status: CANCELLED | OUTPATIENT
Start: 2019-01-01

## 2019-01-01 RX ORDER — SODIUM CHLORIDE 0.9 % (FLUSH) 0.9 %
10 SYRINGE (ML) INJECTION AS NEEDED
Status: CANCELLED | OUTPATIENT
Start: 2019-01-01

## 2019-01-01 RX ORDER — SODIUM CHLORIDE 0.9 % (FLUSH) 0.9 %
10 SYRINGE (ML) INJECTION AS NEEDED
Status: DISCONTINUED | OUTPATIENT
Start: 2019-01-01 | End: 2019-01-01 | Stop reason: HOSPADM

## 2019-01-01 RX ORDER — SODIUM CHLORIDE 0.9 % (FLUSH) 0.9 %
3 SYRINGE (ML) INJECTION EVERY 12 HOURS SCHEDULED
Status: DISCONTINUED | OUTPATIENT
Start: 2019-01-01 | End: 2019-01-01 | Stop reason: HOSPADM

## 2019-01-01 RX ORDER — POTASSIUM CHLORIDE 20 MEQ/1
40 TABLET, EXTENDED RELEASE ORAL AS NEEDED
Status: DISCONTINUED | OUTPATIENT
Start: 2019-01-01 | End: 2019-01-01

## 2019-01-01 RX ORDER — ONDANSETRON 2 MG/ML
8 INJECTION INTRAMUSCULAR; INTRAVENOUS ONCE
Status: COMPLETED | OUTPATIENT
Start: 2019-01-01 | End: 2019-01-01

## 2019-01-01 RX ORDER — BUDESONIDE AND FORMOTEROL FUMARATE DIHYDRATE 160; 4.5 UG/1; UG/1
2 AEROSOL RESPIRATORY (INHALATION)
COMMUNITY

## 2019-01-01 RX ORDER — HYDROCODONE BITARTRATE AND ACETAMINOPHEN 5; 325 MG/1; MG/1
1 TABLET ORAL EVERY 6 HOURS PRN
Status: DISCONTINUED | OUTPATIENT
Start: 2019-01-01 | End: 2019-01-01

## 2019-01-01 RX ORDER — METOPROLOL TARTRATE 5 MG/5ML
2.5 INJECTION INTRAVENOUS ONCE
Status: COMPLETED | OUTPATIENT
Start: 2019-01-01 | End: 2019-01-01

## 2019-01-01 RX ORDER — PALONOSETRON HYDROCHLORIDE 0.05 MG/ML
0.25 INJECTION, SOLUTION INTRAVENOUS ONCE
Status: COMPLETED | OUTPATIENT
Start: 2019-01-01 | End: 2019-01-01

## 2019-01-01 RX ORDER — BUDESONIDE AND FORMOTEROL FUMARATE DIHYDRATE 160; 4.5 UG/1; UG/1
2 AEROSOL RESPIRATORY (INHALATION)
Status: DISCONTINUED | OUTPATIENT
Start: 2019-01-01 | End: 2019-01-01

## 2019-01-01 RX ORDER — FUROSEMIDE 10 MG/ML
20 INJECTION INTRAMUSCULAR; INTRAVENOUS ONCE
Status: COMPLETED | OUTPATIENT
Start: 2019-01-01 | End: 2019-01-01

## 2019-01-01 RX ORDER — FOLIC ACID/MULTIVIT,IRON,MINER .4-18-35
1 TABLET,CHEWABLE ORAL DAILY
Status: DISCONTINUED | OUTPATIENT
Start: 2019-01-01 | End: 2019-01-01

## 2019-01-01 RX ORDER — DEXTROSE AND SODIUM CHLORIDE 5; .9 G/100ML; G/100ML
1000 INJECTION, SOLUTION INTRAVENOUS CONTINUOUS
Status: DISCONTINUED | OUTPATIENT
Start: 2019-01-01 | End: 2019-01-01 | Stop reason: HOSPADM

## 2019-01-01 RX ORDER — SODIUM CHLORIDE 9 MG/ML
250 INJECTION, SOLUTION INTRAVENOUS ONCE
Status: COMPLETED | OUTPATIENT
Start: 2019-01-01 | End: 2019-01-01

## 2019-01-01 RX ORDER — MIDAZOLAM HYDROCHLORIDE 1 MG/ML
2 INJECTION INTRAMUSCULAR; INTRAVENOUS ONCE
Status: COMPLETED | OUTPATIENT
Start: 2019-01-01 | End: 2019-01-01

## 2019-01-01 RX ORDER — FENTANYL CITRATE 50 UG/ML
INJECTION, SOLUTION INTRAMUSCULAR; INTRAVENOUS
Status: COMPLETED
Start: 2019-01-01 | End: 2019-01-01

## 2019-01-01 RX ORDER — DILTIAZEM HCL IN NACL,ISO-OSM 125 MG/125
5 PLASTIC BAG, INJECTION (ML) INTRAVENOUS
Status: DISCONTINUED | OUTPATIENT
Start: 2019-01-01 | End: 2019-01-01

## 2019-01-01 RX ORDER — MORPHINE SULFATE 4 MG/ML
2 INJECTION, SOLUTION INTRAMUSCULAR; INTRAVENOUS
Status: DISCONTINUED | OUTPATIENT
Start: 2019-01-01 | End: 2019-01-01 | Stop reason: HOSPADM

## 2019-01-01 RX ORDER — SODIUM CHLORIDE 0.9 % (FLUSH) 0.9 %
10 SYRINGE (ML) INJECTION EVERY 12 HOURS SCHEDULED
Status: DISCONTINUED | OUTPATIENT
Start: 2019-01-01 | End: 2019-01-01

## 2019-01-01 RX ORDER — SODIUM CHLORIDE 9 MG/ML
250 INJECTION, SOLUTION INTRAVENOUS AS NEEDED
Status: DISCONTINUED | OUTPATIENT
Start: 2019-01-01 | End: 2019-01-01 | Stop reason: HOSPADM

## 2019-01-01 RX ORDER — ALBUTEROL SULFATE 2.5 MG/3ML
2.5 SOLUTION RESPIRATORY (INHALATION) EVERY 12 HOURS
Status: DISCONTINUED | OUTPATIENT
Start: 2019-01-01 | End: 2019-01-01

## 2019-01-01 RX ORDER — DEXTROSE MONOHYDRATE 25 G/50ML
25 INJECTION, SOLUTION INTRAVENOUS
Status: DISCONTINUED | OUTPATIENT
Start: 2019-01-01 | End: 2019-01-01

## 2019-01-01 RX ORDER — MIDAZOLAM HYDROCHLORIDE 1 MG/ML
INJECTION INTRAMUSCULAR; INTRAVENOUS
Status: COMPLETED | OUTPATIENT
Start: 2019-01-01 | End: 2019-01-01

## 2019-01-01 RX ORDER — PHENYLEPHRINE HCL IN 0.9% NACL 0.5 MG/5ML
.5-3 SYRINGE (ML) INTRAVENOUS
Status: DISCONTINUED | OUTPATIENT
Start: 2019-01-01 | End: 2019-01-01

## 2019-01-01 RX ORDER — SODIUM CHLORIDE 0.9 % (FLUSH) 0.9 %
3-10 SYRINGE (ML) INJECTION AS NEEDED
Status: DISCONTINUED | OUTPATIENT
Start: 2019-01-01 | End: 2019-01-01 | Stop reason: HOSPADM

## 2019-01-01 RX ORDER — FENTANYL CITRATE 50 UG/ML
25 INJECTION, SOLUTION INTRAMUSCULAR; INTRAVENOUS ONCE
Status: COMPLETED | OUTPATIENT
Start: 2019-01-01 | End: 2019-01-01

## 2019-01-01 RX ORDER — PROMETHAZINE HYDROCHLORIDE 25 MG/1
12.5 TABLET ORAL EVERY 6 HOURS PRN
Status: DISCONTINUED | OUTPATIENT
Start: 2019-01-01 | End: 2019-01-01

## 2019-01-01 RX ORDER — POTASSIUM CHLORIDE 7.45 MG/ML
10 INJECTION INTRAVENOUS
Status: DISCONTINUED | OUTPATIENT
Start: 2019-01-01 | End: 2019-01-01

## 2019-01-01 RX ORDER — SODIUM CHLORIDE 0.9 % (FLUSH) 0.9 %
10 SYRINGE (ML) INJECTION AS NEEDED
Status: DISCONTINUED | OUTPATIENT
Start: 2019-01-01 | End: 2019-01-01

## 2019-01-01 RX ORDER — PALONOSETRON 0.05 MG/ML
0.25 INJECTION, SOLUTION INTRAVENOUS ONCE
Status: CANCELLED | OUTPATIENT
Start: 2019-01-01

## 2019-01-01 RX ORDER — DIGOXIN 0.25 MG/ML
INJECTION INTRAMUSCULAR; INTRAVENOUS
Status: COMPLETED
Start: 2019-01-01 | End: 2019-01-01

## 2019-01-01 RX ORDER — DEXAMETHASONE 4 MG/1
TABLET ORAL
Qty: 12 TABLET | Refills: 0 | Status: SHIPPED | OUTPATIENT
Start: 2019-01-01 | End: 2019-01-01 | Stop reason: SDUPTHER

## 2019-01-01 RX ORDER — ONDANSETRON HYDROCHLORIDE 8 MG/1
8 TABLET, FILM COATED ORAL 3 TIMES DAILY PRN
Qty: 30 TABLET | Refills: 5 | Status: SHIPPED | OUTPATIENT
Start: 2019-01-01 | End: 2019-01-01

## 2019-01-01 RX ORDER — ONDANSETRON 2 MG/ML
4 INJECTION INTRAMUSCULAR; INTRAVENOUS EVERY 6 HOURS PRN
Status: DISCONTINUED | OUTPATIENT
Start: 2019-01-01 | End: 2019-01-01 | Stop reason: HOSPADM

## 2019-01-01 RX ORDER — LANOLIN ALCOHOL/MO/W.PET/CERES
400 CREAM (GRAM) TOPICAL 2 TIMES DAILY
Qty: 60 TABLET | Refills: 5 | Status: SHIPPED | OUTPATIENT
Start: 2019-01-01

## 2019-01-01 RX ORDER — AMIODARONE HCL/D5W 450 MG/250
0.5 PLASTIC BAG, INJECTION (ML) INTRAVENOUS CONTINUOUS
Status: DISCONTINUED | OUTPATIENT
Start: 2019-01-01 | End: 2019-01-01

## 2019-01-01 RX ORDER — MORPHINE SULFATE 4 MG/ML
1 INJECTION, SOLUTION INTRAMUSCULAR; INTRAVENOUS
Status: DISCONTINUED | OUTPATIENT
Start: 2019-01-01 | End: 2019-01-01

## 2019-01-01 RX ORDER — NOREPINEPHRINE BIT/0.9 % NACL 8 MG/250ML
INFUSION BOTTLE (ML) INTRAVENOUS
Status: COMPLETED
Start: 2019-01-01 | End: 2019-01-01

## 2019-01-01 RX ORDER — ALBUTEROL SULFATE 2.5 MG/3ML
2.5 SOLUTION RESPIRATORY (INHALATION) ONCE
Status: DISCONTINUED | OUTPATIENT
Start: 2019-01-01 | End: 2019-01-01

## 2019-01-01 RX ORDER — NOREPINEPHRINE BIT/0.9 % NACL 8 MG/250ML
.02-.3 INFUSION BOTTLE (ML) INTRAVENOUS
Status: DISCONTINUED | OUTPATIENT
Start: 2019-01-01 | End: 2019-01-01

## 2019-01-01 RX ORDER — MAGNESIUM SULFATE HEPTAHYDRATE 40 MG/ML
2 INJECTION, SOLUTION INTRAVENOUS ONCE
Status: CANCELLED | OUTPATIENT
Start: 2019-01-01

## 2019-01-01 RX ORDER — LORAZEPAM 2 MG/ML
1 INJECTION INTRAMUSCULAR
Status: DISCONTINUED | OUTPATIENT
Start: 2019-01-01 | End: 2019-01-01 | Stop reason: HOSPADM

## 2019-01-01 RX ORDER — LEVOFLOXACIN 500 MG/1
500 TABLET, FILM COATED ORAL DAILY
Qty: 7 TABLET | Refills: 0 | Status: ON HOLD | OUTPATIENT
Start: 2019-01-01 | End: 2019-01-01

## 2019-01-01 RX ORDER — ONDANSETRON 2 MG/ML
INJECTION INTRAMUSCULAR; INTRAVENOUS
Status: COMPLETED | OUTPATIENT
Start: 2019-01-01 | End: 2019-01-01

## 2019-01-01 RX ORDER — AMIODARONE HCL/D5W 450 MG/250
1 PLASTIC BAG, INJECTION (ML) INTRAVENOUS CONTINUOUS
Status: DISPENSED | OUTPATIENT
Start: 2019-01-01 | End: 2019-01-01

## 2019-01-01 RX ORDER — SODIUM CHLORIDE 9 MG/ML
250 INJECTION, SOLUTION INTRAVENOUS AS NEEDED
Status: CANCELLED | OUTPATIENT
Start: 2019-01-01

## 2019-01-01 RX ORDER — DOCUSATE SODIUM 100 MG/1
100 CAPSULE, LIQUID FILLED ORAL 2 TIMES DAILY PRN
Status: DISCONTINUED | OUTPATIENT
Start: 2019-01-01 | End: 2019-01-01

## 2019-01-01 RX ORDER — PROMETHAZINE HYDROCHLORIDE 12.5 MG/1
12.5 TABLET ORAL EVERY 6 HOURS PRN
Qty: 30 TABLET | Refills: 2 | Status: SHIPPED | OUTPATIENT
Start: 2019-01-01

## 2019-01-01 RX ORDER — PALONOSETRON 0.05 MG/ML
0.25 INJECTION, SOLUTION INTRAVENOUS ONCE
Status: CANCELLED | OUTPATIENT
Start: 2019-11-04

## 2019-01-01 RX ORDER — CALCIUM GLUCONATE 20 MG/ML
2 INJECTION, SOLUTION INTRAVENOUS ONCE
Status: COMPLETED | OUTPATIENT
Start: 2019-01-01 | End: 2019-01-01

## 2019-01-01 RX ORDER — SODIUM CHLORIDE 9 MG/ML
330 INJECTION, SOLUTION INTRAVENOUS CONTINUOUS
Status: ACTIVE | OUTPATIENT
Start: 2019-01-01 | End: 2019-01-01

## 2019-01-01 RX ORDER — NICOTINE POLACRILEX 4 MG
15 LOZENGE BUCCAL
Status: DISCONTINUED | OUTPATIENT
Start: 2019-01-01 | End: 2019-01-01

## 2019-01-01 RX ORDER — MORPHINE SULFATE 4 MG/ML
INJECTION, SOLUTION INTRAMUSCULAR; INTRAVENOUS
Status: COMPLETED
Start: 2019-01-01 | End: 2019-01-01

## 2019-01-01 RX ORDER — ACETYLCYSTEINE 200 MG/ML
3 SOLUTION ORAL; RESPIRATORY (INHALATION) EVERY 12 HOURS SCHEDULED
Status: DISCONTINUED | OUTPATIENT
Start: 2019-01-01 | End: 2019-01-01

## 2019-01-01 RX ORDER — DIGOXIN 0.25 MG/ML
125 INJECTION INTRAMUSCULAR; INTRAVENOUS ONCE
Status: COMPLETED | OUTPATIENT
Start: 2019-01-01 | End: 2019-01-01

## 2019-01-01 RX ORDER — VALACYCLOVIR HYDROCHLORIDE 500 MG/1
500 TABLET, FILM COATED ORAL 2 TIMES DAILY
COMMUNITY
End: 2019-01-01

## 2019-01-01 RX ORDER — LANOLIN ALCOHOL/MO/W.PET/CERES
400 CREAM (GRAM) TOPICAL DAILY
Qty: 30 TABLET | Refills: 3 | Status: SHIPPED | OUTPATIENT
Start: 2019-01-01 | End: 2019-01-01 | Stop reason: SDUPTHER

## 2019-01-01 RX ORDER — SODIUM CHLORIDE 9 MG/ML
250 INJECTION, SOLUTION INTRAVENOUS ONCE
Status: CANCELLED | OUTPATIENT
Start: 2019-11-04

## 2019-01-01 RX ORDER — ATORVASTATIN CALCIUM 10 MG/1
10 TABLET, FILM COATED ORAL DAILY
COMMUNITY

## 2019-01-01 RX ORDER — DEXAMETHASONE 4 MG/1
TABLET ORAL
Qty: 12 TABLET | Refills: 0 | Status: SHIPPED | OUTPATIENT
Start: 2019-01-01 | End: 2019-01-01

## 2019-01-01 RX ORDER — ONDANSETRON 4 MG/1
4 TABLET, FILM COATED ORAL EVERY 6 HOURS PRN
Status: DISCONTINUED | OUTPATIENT
Start: 2019-01-01 | End: 2019-01-01 | Stop reason: HOSPADM

## 2019-01-01 RX ORDER — PANTOPRAZOLE SODIUM 40 MG/10ML
40 INJECTION, POWDER, LYOPHILIZED, FOR SOLUTION INTRAVENOUS
Status: DISCONTINUED | OUTPATIENT
Start: 2019-01-01 | End: 2019-01-01

## 2019-01-01 RX ORDER — POTASSIUM CHLORIDE 1.5 G/1.77G
40 POWDER, FOR SOLUTION ORAL ONCE
Status: DISCONTINUED | OUTPATIENT
Start: 2019-01-01 | End: 2019-01-01

## 2019-01-01 RX ORDER — ASPIRIN 81 MG/1
81 TABLET ORAL DAILY
COMMUNITY

## 2019-01-01 RX ORDER — MIDAZOLAM HYDROCHLORIDE 1 MG/ML
INJECTION INTRAMUSCULAR; INTRAVENOUS
Status: COMPLETED
Start: 2019-01-01 | End: 2019-01-01

## 2019-01-01 RX ORDER — FENTANYL CITRATE 50 UG/ML
INJECTION, SOLUTION INTRAMUSCULAR; INTRAVENOUS
Status: COMPLETED | OUTPATIENT
Start: 2019-01-01 | End: 2019-01-01

## 2019-01-01 RX ORDER — ACETAMINOPHEN 500 MG
500 TABLET ORAL EVERY 6 HOURS PRN
Status: DISCONTINUED | OUTPATIENT
Start: 2019-01-01 | End: 2019-01-01

## 2019-01-01 RX ORDER — ACETAMINOPHEN 500 MG
500 TABLET ORAL EVERY 6 HOURS PRN
COMMUNITY

## 2019-01-01 RX ORDER — DEXAMETHASONE 4 MG/1
TABLET ORAL
Qty: 12 TABLET | Refills: 1 | Status: CANCELLED | OUTPATIENT
Start: 2019-01-01

## 2019-01-01 RX ORDER — HYDROCODONE BITARTRATE AND ACETAMINOPHEN 5; 325 MG/1; MG/1
1 TABLET ORAL EVERY 6 HOURS PRN
Qty: 60 TABLET | Refills: 0 | Status: SHIPPED | OUTPATIENT
Start: 2019-01-01

## 2019-01-01 RX ORDER — ATORVASTATIN CALCIUM 10 MG/1
10 TABLET, FILM COATED ORAL DAILY
Status: DISCONTINUED | OUTPATIENT
Start: 2019-01-01 | End: 2019-01-01

## 2019-01-01 RX ORDER — DOCUSATE SODIUM 100 MG/1
100 CAPSULE, LIQUID FILLED ORAL 2 TIMES DAILY PRN
Qty: 60 CAPSULE | Refills: 3 | Status: SHIPPED | OUTPATIENT
Start: 2019-01-01

## 2019-01-01 RX ORDER — ASPIRIN 81 MG/1
81 TABLET ORAL DAILY
Status: DISCONTINUED | OUTPATIENT
Start: 2019-01-01 | End: 2019-01-01

## 2019-01-01 RX ORDER — DEXAMETHASONE 4 MG/1
TABLET ORAL
Qty: 12 TABLET | Refills: 1 | Status: SHIPPED | OUTPATIENT
Start: 2019-01-01 | End: 2019-01-01

## 2019-01-01 RX ORDER — POTASSIUM CHLORIDE 1.5 G/1.77G
40 POWDER, FOR SOLUTION ORAL AS NEEDED
Status: DISCONTINUED | OUTPATIENT
Start: 2019-01-01 | End: 2019-01-01

## 2019-01-01 RX ORDER — METOPROLOL TARTRATE 5 MG/5ML
5 INJECTION INTRAVENOUS ONCE
Status: COMPLETED | OUTPATIENT
Start: 2019-01-01 | End: 2019-01-01

## 2019-01-01 RX ORDER — ACETAMINOPHEN 325 MG/1
650 TABLET ORAL EVERY 4 HOURS PRN
Status: DISCONTINUED | OUTPATIENT
Start: 2019-01-01 | End: 2019-01-01 | Stop reason: HOSPADM

## 2019-01-01 RX ORDER — SODIUM CHLORIDE FOR INHALATION 3 %
4 VIAL, NEBULIZER (ML) INHALATION
Status: DISCONTINUED | OUTPATIENT
Start: 2019-01-01 | End: 2019-01-01

## 2019-01-01 RX ADMIN — Medication 0.5 MCG/KG/MIN: at 17:13

## 2019-01-01 RX ADMIN — MORPHINE SULFATE 1 MG: 4 INJECTION INTRAVENOUS at 19:30

## 2019-01-01 RX ADMIN — Medication 10 ML: at 10:52

## 2019-01-01 RX ADMIN — FENTANYL CITRATE 100 MCG: 50 INJECTION, SOLUTION INTRAMUSCULAR; INTRAVENOUS at 09:00

## 2019-01-01 RX ADMIN — CISPLATIN 76 MG: 1 INJECTION, SOLUTION INTRAVENOUS at 14:31

## 2019-01-01 RX ADMIN — ETOPOSIDE 80 MG: 20 INJECTION INTRAVENOUS at 10:18

## 2019-01-01 RX ADMIN — SODIUM CHLORIDE, PRESERVATIVE FREE 10 ML: 5 INJECTION INTRAVENOUS at 17:29

## 2019-01-01 RX ADMIN — ATORVASTATIN CALCIUM 10 MG: 10 TABLET, FILM COATED ORAL at 08:46

## 2019-01-01 RX ADMIN — DEXAMETHASONE SODIUM PHOSPHATE 12 MG: 4 INJECTION, SOLUTION INTRA-ARTICULAR; INTRALESIONAL; INTRAMUSCULAR; INTRAVENOUS; SOFT TISSUE at 12:52

## 2019-01-01 RX ADMIN — ONDANSETRON 8 MG: 2 INJECTION, SOLUTION INTRAMUSCULAR; INTRAVENOUS at 16:09

## 2019-01-01 RX ADMIN — HEPARIN 500 UNITS: 100 SYRINGE at 10:43

## 2019-01-01 RX ADMIN — ONDANSETRON 4 MG: 2 INJECTION INTRAMUSCULAR; INTRAVENOUS at 08:18

## 2019-01-01 RX ADMIN — HEPARIN 500 UNITS: 100 SYRINGE at 11:29

## 2019-01-01 RX ADMIN — FENTANYL CITRATE 50 MCG: 50 INJECTION, SOLUTION INTRAMUSCULAR; INTRAVENOUS at 09:03

## 2019-01-01 RX ADMIN — SODIUM CHLORIDE 500 ML: 900 INJECTION, SOLUTION INTRAVENOUS at 18:20

## 2019-01-01 RX ADMIN — FILGRASTIM 300 MCG: 300 INJECTION, SOLUTION INTRAVENOUS; SUBCUTANEOUS at 13:54

## 2019-01-01 RX ADMIN — DEXAMETHASONE SODIUM PHOSPHATE 12 MG: 4 INJECTION, SOLUTION INTRA-ARTICULAR; INTRALESIONAL; INTRAMUSCULAR; INTRAVENOUS; SOFT TISSUE at 13:38

## 2019-01-01 RX ADMIN — MORPHINE SULFATE 1 MG: 4 INJECTION INTRAVENOUS at 18:48

## 2019-01-01 RX ADMIN — POTASSIUM CHLORIDE 1000 ML: 2 INJECTION, SOLUTION, CONCENTRATE INTRAVENOUS at 08:57

## 2019-01-01 RX ADMIN — Medication 500 UNITS: at 16:44

## 2019-01-01 RX ADMIN — SODIUM CHLORIDE 250 ML: 900 INJECTION, SOLUTION INTRAVENOUS at 10:54

## 2019-01-01 RX ADMIN — CEFEPIME HYDROCHLORIDE 2 G: 2 INJECTION, POWDER, FOR SOLUTION INTRAVENOUS at 00:30

## 2019-01-01 RX ADMIN — BUDESONIDE AND FORMOTEROL FUMARATE DIHYDRATE 2 PUFF: 160; 4.5 AEROSOL RESPIRATORY (INHALATION) at 07:00

## 2019-01-01 RX ADMIN — ETOPOSIDE 80 MG: 20 INJECTION INTRAVENOUS at 13:50

## 2019-01-01 RX ADMIN — DEXAMETHASONE SODIUM PHOSPHATE 12 MG: 4 INJECTION, SOLUTION INTRA-ARTICULAR; INTRALESIONAL; INTRAMUSCULAR; INTRAVENOUS; SOFT TISSUE at 11:40

## 2019-01-01 RX ADMIN — MAGNESIUM SULFATE HEPTAHYDRATE 1000 ML: 500 INJECTION, SOLUTION INTRAMUSCULAR; INTRAVENOUS at 15:51

## 2019-01-01 RX ADMIN — CEFEPIME HYDROCHLORIDE 2 G: 2 INJECTION, POWDER, FOR SOLUTION INTRAVENOUS at 08:46

## 2019-01-01 RX ADMIN — Medication 10 ML: at 08:48

## 2019-01-01 RX ADMIN — DILTIAZEM HYDROCHLORIDE 5 MG/HR: 5 INJECTION INTRAVENOUS at 10:04

## 2019-01-01 RX ADMIN — SODIUM CHLORIDE, PRESERVATIVE FREE 10 ML: 5 INJECTION INTRAVENOUS at 17:00

## 2019-01-01 RX ADMIN — PALONOSETRON 0.25 MG: 0.25 INJECTION, SOLUTION INTRAVENOUS at 12:51

## 2019-01-01 RX ADMIN — SODIUM CHLORIDE 100 ML: 900 INJECTION, SOLUTION INTRAVENOUS at 13:27

## 2019-01-01 RX ADMIN — AMIODARONE HYDROCHLORIDE 150 MG: 1.5 INJECTION, SOLUTION INTRAVENOUS at 17:00

## 2019-01-01 RX ADMIN — SODIUM CHLORIDE 100 ML: 900 INJECTION, SOLUTION INTRAVENOUS at 11:06

## 2019-01-01 RX ADMIN — ASPIRIN 81 MG: 81 TABLET, COATED ORAL at 08:46

## 2019-01-01 RX ADMIN — POTASSIUM CHLORIDE 1000 ML: 2 INJECTION, SOLUTION, CONCENTRATE INTRAVENOUS at 14:56

## 2019-01-01 RX ADMIN — HEPARIN 500 UNITS: 100 SYRINGE at 10:47

## 2019-01-01 RX ADMIN — PALONOSETRON 0.25 MG: 0.25 INJECTION, SOLUTION INTRAVENOUS at 11:05

## 2019-01-01 RX ADMIN — FILGRASTIM 300 MCG: 300 INJECTION, SOLUTION INTRAVENOUS; SUBCUTANEOUS at 16:15

## 2019-01-01 RX ADMIN — MIDAZOLAM 1 MG: 1 INJECTION INTRAMUSCULAR; INTRAVENOUS at 09:05

## 2019-01-01 RX ADMIN — ETOPOSIDE 80 MG: 20 INJECTION INTRAVENOUS at 09:43

## 2019-01-01 RX ADMIN — CALCIUM GLUCONATE 2 G: 20 INJECTION, SOLUTION INTRAVENOUS at 19:45

## 2019-01-01 RX ADMIN — Medication 10 ML: at 10:48

## 2019-01-01 RX ADMIN — MIDAZOLAM 1 MG: 1 INJECTION INTRAMUSCULAR; INTRAVENOUS at 09:00

## 2019-01-01 RX ADMIN — VASOPRESSIN 0.03 UNITS/MIN: 20 INJECTION INTRAVENOUS at 22:01

## 2019-01-01 RX ADMIN — POTASSIUM CHLORIDE 1000 ML: 2 INJECTION, SOLUTION, CONCENTRATE INTRAVENOUS at 10:40

## 2019-01-01 RX ADMIN — POTASSIUM CHLORIDE 40 MEQ: 1500 TABLET, EXTENDED RELEASE ORAL at 12:05

## 2019-01-01 RX ADMIN — CEFAZOLIN SODIUM 2 G: 1 INJECTION, POWDER, FOR SOLUTION INTRAMUSCULAR; INTRAVENOUS at 08:50

## 2019-01-01 RX ADMIN — DILTIAZEM HYDROCHLORIDE 15 MG/HR: 5 INJECTION INTRAVENOUS at 16:21

## 2019-01-01 RX ADMIN — MORPHINE SULFATE 2 MG: 4 INJECTION INTRAVENOUS at 00:16

## 2019-01-01 RX ADMIN — POTASSIUM CHLORIDE 1000 ML: 2 INJECTION, SOLUTION, CONCENTRATE INTRAVENOUS at 15:14

## 2019-01-01 RX ADMIN — HEPARIN SODIUM (PORCINE) LOCK FLUSH IV SOLN 100 UNIT/ML 500 UNITS: 100 SOLUTION at 09:37

## 2019-01-01 RX ADMIN — Medication 10 ML: at 10:47

## 2019-01-01 RX ADMIN — PANTOPRAZOLE SODIUM 40 MG: 40 INJECTION, POWDER, FOR SOLUTION INTRAVENOUS at 05:04

## 2019-01-01 RX ADMIN — DEXAMETHASONE SODIUM PHOSPHATE 12 MG: 4 INJECTION, SOLUTION INTRA-ARTICULAR; INTRALESIONAL; INTRAMUSCULAR; INTRAVENOUS; SOFT TISSUE at 12:02

## 2019-01-01 RX ADMIN — AMIODARONE HYDROCHLORIDE 0.5 MG/MIN: 50 INJECTION, SOLUTION INTRAVENOUS at 12:56

## 2019-01-01 RX ADMIN — FILGRASTIM 300 MCG: 300 INJECTION, SOLUTION INTRAVENOUS; SUBCUTANEOUS at 11:35

## 2019-01-01 RX ADMIN — POTASSIUM CHLORIDE 1000 ML: 2 INJECTION, SOLUTION, CONCENTRATE INTRAVENOUS at 11:24

## 2019-01-01 RX ADMIN — HEPARIN 500 UNITS: 100 SYRINGE at 10:57

## 2019-01-01 RX ADMIN — MIDAZOLAM 2 MG: 1 INJECTION INTRAMUSCULAR; INTRAVENOUS at 15:15

## 2019-01-01 RX ADMIN — PALONOSETRON 0.25 MG: 0.25 INJECTION, SOLUTION INTRAVENOUS at 10:54

## 2019-01-01 RX ADMIN — CISPLATIN 76 MG: 1 INJECTION, SOLUTION INTRAVENOUS at 14:02

## 2019-01-01 RX ADMIN — SODIUM CHLORIDE 250 ML: 900 INJECTION, SOLUTION INTRAVENOUS at 11:21

## 2019-01-01 RX ADMIN — PHENYLEPHRINE HYDROCHLORIDE 3 MCG/KG/MIN: 10 INJECTION INTRAVENOUS at 05:29

## 2019-01-01 RX ADMIN — POTASSIUM CHLORIDE 40 MEQ: 1500 TABLET, EXTENDED RELEASE ORAL at 05:04

## 2019-01-01 RX ADMIN — FUROSEMIDE 20 MG: 10 INJECTION, SOLUTION INTRAMUSCULAR; INTRAVENOUS at 08:46

## 2019-01-01 RX ADMIN — Medication 30 ML: at 13:24

## 2019-01-01 RX ADMIN — IPRATROPIUM BROMIDE 0.5 MG: 0.5 SOLUTION RESPIRATORY (INHALATION) at 16:25

## 2019-01-01 RX ADMIN — SODIUM CHLORIDE 100 ML: 900 INJECTION, SOLUTION INTRAVENOUS at 12:54

## 2019-01-01 RX ADMIN — VASOPRESSIN 0.03 UNITS/MIN: 20 INJECTION INTRAVENOUS at 14:12

## 2019-01-01 RX ADMIN — Medication 1 MG/MIN: at 17:10

## 2019-01-01 RX ADMIN — MIDAZOLAM 0.5 MG: 1 INJECTION INTRAMUSCULAR; INTRAVENOUS at 09:03

## 2019-01-01 RX ADMIN — HEPARIN 500 UNITS: 100 SYRINGE at 16:18

## 2019-01-01 RX ADMIN — INSULIN LISPRO 3 UNITS: 100 INJECTION, SOLUTION INTRAVENOUS; SUBCUTANEOUS at 12:56

## 2019-01-01 RX ADMIN — IOPAMIDOL 50 ML: 755 INJECTION, SOLUTION INTRAVENOUS at 14:45

## 2019-01-01 RX ADMIN — POTASSIUM CHLORIDE 1000 ML: 2 INJECTION, SOLUTION, CONCENTRATE INTRAVENOUS at 10:24

## 2019-01-01 RX ADMIN — VANCOMYCIN HYDROCHLORIDE 1000 MG: 10 INJECTION, POWDER, LYOPHILIZED, FOR SOLUTION INTRAVENOUS at 00:38

## 2019-01-01 RX ADMIN — Medication 10 ML: at 17:29

## 2019-01-01 RX ADMIN — ATORVASTATIN CALCIUM 10 MG: 10 TABLET, FILM COATED ORAL at 22:16

## 2019-01-01 RX ADMIN — INSULIN LISPRO 5 UNITS: 100 INJECTION, SOLUTION INTRAVENOUS; SUBCUTANEOUS at 17:54

## 2019-01-01 RX ADMIN — CISPLATIN 76 MG: 1 INJECTION, SOLUTION INTRAVENOUS at 13:16

## 2019-01-01 RX ADMIN — FILGRASTIM 300 MCG: 300 INJECTION, SOLUTION INTRAVENOUS; SUBCUTANEOUS at 14:04

## 2019-01-01 RX ADMIN — ETOPOSIDE 80 MG: 20 INJECTION INTRAVENOUS at 14:10

## 2019-01-01 RX ADMIN — IPRATROPIUM BROMIDE 0.5 MG: 0.5 SOLUTION RESPIRATORY (INHALATION) at 21:20

## 2019-01-01 RX ADMIN — PHENYLEPHRINE HYDROCHLORIDE 2.5 MCG/KG/MIN: 10 INJECTION INTRAVENOUS at 11:42

## 2019-01-01 RX ADMIN — Medication 10 ML: at 16:53

## 2019-01-01 RX ADMIN — FILGRASTIM 300 MCG: 300 INJECTION, SOLUTION INTRAVENOUS; SUBCUTANEOUS at 14:50

## 2019-01-01 RX ADMIN — SODIUM CHLORIDE 100 ML: 900 INJECTION, SOLUTION INTRAVENOUS at 11:25

## 2019-01-01 RX ADMIN — DIGOXIN 125 MCG: 250 INJECTION, SOLUTION INTRAMUSCULAR; INTRAVENOUS; PARENTERAL at 17:12

## 2019-01-01 RX ADMIN — HEPARIN 500 UNITS: 100 SYRINGE at 10:52

## 2019-01-01 RX ADMIN — DEXTROSE MONOHYDRATE AND SODIUM CHLORIDE 1000 ML: 5; .9 INJECTION, SOLUTION INTRAVENOUS at 10:34

## 2019-01-01 RX ADMIN — Medication 10 ML: at 10:57

## 2019-01-01 RX ADMIN — CISPLATIN 76 MG: 1 INJECTION, SOLUTION INTRAVENOUS at 12:03

## 2019-01-01 RX ADMIN — ETOPOSIDE 80 MG: 20 INJECTION INTRAVENOUS at 09:38

## 2019-01-01 RX ADMIN — Medication 10 ML: at 11:29

## 2019-01-01 RX ADMIN — MINERAL OIL, PETROLATUM: 425; 573 OINTMENT OPHTHALMIC at 05:58

## 2019-01-01 RX ADMIN — ETOPOSIDE 80 MG: 20 INJECTION INTRAVENOUS at 09:27

## 2019-01-01 RX ADMIN — SODIUM CHLORIDE 100 ML: 900 INJECTION, SOLUTION INTRAVENOUS at 12:19

## 2019-01-01 RX ADMIN — Medication 10 ML: at 22:17

## 2019-01-01 RX ADMIN — ETOPOSIDE 80 MG: 20 INJECTION INTRAVENOUS at 09:32

## 2019-01-01 RX ADMIN — MORPHINE SULFATE 2 MG: 4 INJECTION INTRAVENOUS at 21:44

## 2019-01-01 RX ADMIN — FENTANYL CITRATE 25 MCG: 50 INJECTION, SOLUTION INTRAMUSCULAR; INTRAVENOUS at 15:15

## 2019-01-01 RX ADMIN — MAGNESIUM OXIDE TAB 400 MG (241.3 MG ELEMENTAL MG) 400 MG: 400 (241.3 MG) TAB at 22:16

## 2019-01-01 RX ADMIN — Medication 1 TABLET: at 08:46

## 2019-01-01 RX ADMIN — CALCIUM GLUCONATE 2 G: 20 INJECTION, SOLUTION INTRAVENOUS at 10:04

## 2019-01-01 RX ADMIN — POTASSIUM CHLORIDE 1000 ML: 2 INJECTION, SOLUTION, CONCENTRATE INTRAVENOUS at 14:41

## 2019-01-01 RX ADMIN — AMIODARONE HYDROCHLORIDE 0.5 MG/MIN: 50 INJECTION, SOLUTION INTRAVENOUS at 00:07

## 2019-01-01 RX ADMIN — Medication 10 ML: at 17:42

## 2019-01-01 RX ADMIN — HEPARIN 500 UNITS: 100 SYRINGE at 17:00

## 2019-01-01 RX ADMIN — SODIUM CHLORIDE 100 ML: 900 INJECTION, SOLUTION INTRAVENOUS at 10:54

## 2019-01-01 RX ADMIN — IPRATROPIUM BROMIDE 0.5 MG: 0.5 SOLUTION RESPIRATORY (INHALATION) at 13:35

## 2019-01-01 RX ADMIN — MAGNESIUM OXIDE TAB 400 MG (241.3 MG ELEMENTAL MG) 400 MG: 400 (241.3 MG) TAB at 08:47

## 2019-01-01 RX ADMIN — HEPARIN 500 UNITS: 100 SYRINGE at 16:53

## 2019-01-01 RX ADMIN — FENTANYL CITRATE 50 MCG: 50 INJECTION, SOLUTION INTRAMUSCULAR; INTRAVENOUS at 08:29

## 2019-01-01 RX ADMIN — FLUDEOXYGLUCOSE F18 1 DOSE: 300 INJECTION INTRAVENOUS at 12:05

## 2019-01-01 RX ADMIN — SODIUM CHLORIDE 330 ML/HR: 900 INJECTION, SOLUTION INTRAVENOUS at 12:09

## 2019-01-01 RX ADMIN — POTASSIUM CHLORIDE 1000 ML: 2 INJECTION, SOLUTION, CONCENTRATE INTRAVENOUS at 15:25

## 2019-01-01 RX ADMIN — VASOPRESSIN 0.03 UNITS/MIN: 20 INJECTION INTRAVENOUS at 04:58

## 2019-01-01 RX ADMIN — ETOPOSIDE 80 MG: 20 INJECTION INTRAVENOUS at 09:39

## 2019-01-01 RX ADMIN — POTASSIUM CHLORIDE 1000 ML: 2 INJECTION, SOLUTION, CONCENTRATE INTRAVENOUS at 09:04

## 2019-01-01 RX ADMIN — METOPROLOL TARTRATE 5 MG: 5 INJECTION INTRAVENOUS at 17:00

## 2019-01-01 RX ADMIN — MIDAZOLAM 0.5 MG: 1 INJECTION INTRAMUSCULAR; INTRAVENOUS at 08:29

## 2019-01-01 RX ADMIN — POTASSIUM CHLORIDE 1000 ML: 2 INJECTION, SOLUTION, CONCENTRATE INTRAVENOUS at 12:23

## 2019-01-01 RX ADMIN — Medication 500 UNITS: at 10:52

## 2019-01-01 RX ADMIN — POTASSIUM CHLORIDE 40 MEQ: 1500 TABLET, EXTENDED RELEASE ORAL at 08:46

## 2019-01-01 RX ADMIN — SODIUM CHLORIDE, PRESERVATIVE FREE 10 ML: 5 INJECTION INTRAVENOUS at 16:17

## 2019-01-01 RX ADMIN — HEPARIN 500 UNITS: 100 SYRINGE at 17:29

## 2019-01-01 RX ADMIN — SODIUM CHLORIDE 250 ML: 900 INJECTION, SOLUTION INTRAVENOUS at 12:51

## 2019-01-01 RX ADMIN — HEPARIN 500 UNITS: 100 SYRINGE at 12:38

## 2019-01-01 RX ADMIN — BUDESONIDE AND FORMOTEROL FUMARATE DIHYDRATE 2 PUFF: 160; 4.5 AEROSOL RESPIRATORY (INHALATION) at 21:20

## 2019-01-01 RX ADMIN — CISPLATIN 76 MG: 1 INJECTION, SOLUTION INTRAVENOUS at 14:07

## 2019-01-01 RX ADMIN — Medication 10 ML: at 16:44

## 2019-01-01 RX ADMIN — MORPHINE SULFATE 2 MG: 4 INJECTION INTRAVENOUS at 04:25

## 2019-01-01 RX ADMIN — SODIUM CHLORIDE 1000 ML: 900 INJECTION, SOLUTION INTRAVENOUS at 14:26

## 2019-01-01 RX ADMIN — PALONOSETRON 0.25 MG: 0.25 INJECTION, SOLUTION INTRAVENOUS at 11:24

## 2019-01-01 RX ADMIN — CISPLATIN 76 MG: 1 INJECTION, SOLUTION INTRAVENOUS at 12:44

## 2019-01-01 RX ADMIN — PHENYLEPHRINE HYDROCHLORIDE 0.5 MCG/KG/MIN: 10 INJECTION INTRAVENOUS at 22:02

## 2019-01-01 RX ADMIN — ETOPOSIDE 80 MG: 20 INJECTION INTRAVENOUS at 09:24

## 2019-01-01 RX ADMIN — FUROSEMIDE 20 MG: 10 INJECTION, SOLUTION INTRAMUSCULAR; INTRAVENOUS at 00:49

## 2019-01-01 RX ADMIN — POTASSIUM CHLORIDE 1000 ML: 2 INJECTION, SOLUTION, CONCENTRATE INTRAVENOUS at 15:55

## 2019-01-01 RX ADMIN — DEXAMETHASONE SODIUM PHOSPHATE 12 MG: 4 INJECTION, SOLUTION INTRA-ARTICULAR; INTRALESIONAL; INTRAMUSCULAR; INTRAVENOUS; SOFT TISSUE at 12:27

## 2019-01-01 RX ADMIN — HEPARIN SODIUM (PORCINE) LOCK FLUSH IV SOLN 100 UNIT/ML 500 UNITS: 100 SOLUTION at 17:04

## 2019-01-01 RX ADMIN — INSULIN LISPRO 4 UNITS: 100 INJECTION, SOLUTION INTRAVENOUS; SUBCUTANEOUS at 08:47

## 2019-01-01 RX ADMIN — DEXAMETHASONE SODIUM PHOSPHATE 12 MG: 4 INJECTION, SOLUTION INTRA-ARTICULAR; INTRALESIONAL; INTRAMUSCULAR; INTRAVENOUS; SOFT TISSUE at 13:59

## 2019-01-01 RX ADMIN — Medication 10 ML: at 12:38

## 2019-01-01 RX ADMIN — DIGOXIN 125 MCG: 0.25 INJECTION INTRAMUSCULAR; INTRAVENOUS at 17:12

## 2019-01-01 RX ADMIN — PALONOSETRON 0.25 MG: 0.25 INJECTION, SOLUTION INTRAVENOUS at 13:26

## 2019-01-01 RX ADMIN — MIDAZOLAM HYDROCHLORIDE 2 MG: 1 INJECTION INTRAMUSCULAR; INTRAVENOUS at 15:15

## 2019-01-01 RX ADMIN — SODIUM CHLORIDE 1000 ML: 900 INJECTION, SOLUTION INTRAVENOUS at 09:45

## 2019-01-01 RX ADMIN — PALONOSETRON 0.25 MG: 0.25 INJECTION, SOLUTION INTRAVENOUS at 12:17

## 2019-01-01 RX ADMIN — METOPROLOL TARTRATE 2.5 MG: 1 INJECTION, SOLUTION INTRAVENOUS at 01:33

## 2019-01-01 RX ADMIN — VANCOMYCIN HYDROCHLORIDE 750 MG: 750 INJECTION, POWDER, LYOPHILIZED, FOR SOLUTION INTRAVENOUS at 12:05

## 2019-01-01 RX ADMIN — GLYCOPYRROLATE 0.1 MG: 0.2 INJECTION, SOLUTION INTRAMUSCULAR; INTRAVENOUS at 18:52

## 2019-01-01 RX ADMIN — HEPARIN 500 UNITS: 100 SYRINGE at 10:49

## 2019-01-01 RX ADMIN — Medication 20 ML: at 10:42

## 2019-08-01 NOTE — PROGRESS NOTES
Hematology/Oncology Outpatient Consultation    Mendoza Robert  1945    Primary Care Physician: Seamus Paige MD  Referring Physician: Seamus Paige MD  Reason For Consult:     Chief complaint:  Lung mass  History of Present Illness:  Patient is in my office for initial visit on 8/1/2019.  3 weeks prior to that he resented to the emergency room with shortness of air.  At that time a chest x-ray was obtained that was followed by a CT scan of the chest.  7/13/2019 CT scan of the chest without contrast was obtained which shows emphysematous changes of the bilateral lungs.  There is a pleural-based lobulated, partly necrotic mass in the posterior aspect of the right lower lobe measuring 8.5 x 7.2 x 4.2 cm.  She was sent to the cancer [facility] center and seen initially on 8/1/2019  Past Medical History:   Diagnosis Date   • Atrial fibrillation (CMS/HCC)    • COPD (chronic obstructive pulmonary disease) (CMS/HCC)     On continuous oxygen   • Pneumonia     History of pneumonia   • Psychiatric problem        Past Surgical History:   Procedure Laterality Date   • APPENDECTOMY     • SHOULDER SURGERY Left     x 2         Current Outpatient Medications:   •  albuterol (PROVENTIL) (5 MG/ML) 0.5% nebulizer solution, Take 2.5 mg by nebulization Every 6 (Six) Hours As Needed for Wheezing., Disp: , Rfl:   •  aspirin 81 MG EC tablet, Take 81 mg by mouth Daily., Disp: , Rfl:   •  atorvastatin (LIPITOR) 10 MG tablet, Take 10 mg by mouth Daily., Disp: , Rfl:   •  budesonide-formoterol (SYMBICORT) 160-4.5 MCG/ACT inhaler, Inhale 2 puffs 2 (Two) Times a Day., Disp: , Rfl:   •  ipratropium (ATROVENT) 0.02 % nebulizer solution, Take 500 mcg by nebulization 4 (Four) Times a Day., Disp: , Rfl:   •  metoprolol tartrate (LOPRESSOR) 25 MG tablet, Take 25 mg by mouth Daily., Disp: , Rfl: 1  •  Multiple Vitamins-Minerals (EYE VITAMINS PO), Take 1 capsule by mouth., Disp: , Rfl:     No Known Allergies      There is no immunization  "history on file for this patient.    Family History   Problem Relation Age of Onset   • No Known Problems Mother    • No Known Problems Father    • No Known Problems Sister    • No Known Problems Brother    • No Known Problems Maternal Grandmother    • No Known Problems Maternal Grandfather    • No Known Problems Paternal Grandmother    • No Known Problems Paternal Grandfather        Cancer-related family history is not on file.    Social History     Tobacco Use   • Smoking status: Smoker, Current Status Unknown     Packs/day: 2.00     Years: 53.00     Pack years: 106.00     Types: Cigarettes   • Smokeless tobacco: Current User     Types: Chew   Substance Use Topics   • Alcohol use: Not on file   • Drug use: Not on file     Subjective:  Patient and has cough no hemoptysis.  Uses 4 L of oxygen continuously.  He is in my office a coming by his daughters lost about 5 pounds in the last 3 months  ROS:     Review of Systems   Constitutional: Negative for fever.   HENT: Negative for nosebleeds and trouble swallowing.    Eyes: Negative for visual disturbance.   Respiratory: Negative for cough, shortness of breath and wheezing.    Cardiovascular: Negative for chest pain.   Gastrointestinal: Negative for abdominal pain and blood in stool.   Endocrine: Negative for cold intolerance.   Genitourinary: Negative for dysuria and hematuria.   Musculoskeletal: Negative for joint swelling.   Skin: Negative for rash.   Allergic/Immunologic: Negative for environmental allergies.   Neurological: Negative for seizures.   Hematological: Does not bruise/bleed easily.   Psychiatric/Behavioral: The patient is not nervous/anxious.        Objective:    Vitals:    08/01/19 1237   BP: 123/64   Pulse: 84   Resp: 22   Temp: 97.7 °F (36.5 °C)   Weight: 47.9 kg (105 lb 8 oz)   Height: 165.1 cm (65\")           Physical Exam:    Physical Exam   Constitutional: He is oriented to person, place, and time. No distress.   HENT:   Head: Normocephalic and " atraumatic.   Eyes: Conjunctivae and EOM are normal. Right eye exhibits no discharge. Left eye exhibits no discharge. No scleral icterus.   Neck: Normal range of motion. Neck supple. No thyromegaly present.   Cardiovascular: Normal rate, regular rhythm and normal heart sounds. Exam reveals no gallop and no friction rub.   Tachycardia   Pulmonary/Chest: Effort normal. No stridor. No respiratory distress. He has no wheezes.   Decreased breath sounds bilaterally   Abdominal: Soft. Bowel sounds are normal. He exhibits no mass. There is no tenderness. There is no rebound and no guarding.   Musculoskeletal: Normal range of motion. He exhibits no tenderness.   Lymphadenopathy:     He has no cervical adenopathy.   Neurological: He is alert and oriented to person, place, and time. He exhibits normal muscle tone.   Skin: Skin is warm. No rash noted. He is not diaphoretic. No erythema.   Psychiatric: He has a normal mood and affect. His behavior is normal.   Nursing note and vitals reviewed.      RECENT LABS  No results found for: WBC, RBC, HGB, HCT, MCV, MCH, MCHC, RDW, RDWSD, MPV, PLT, NEUTRORELPCT, LYMPHORELPCT, MONORELPCT, EOSRELPCT, BASORELPCT, AUTOIGPER, NEUTROABS, LYMPHSABS, MONOSABS, EOSABS, BASOSABS, AUTOIGNUM, NRBC    No results found for: GLUCOSE, BUN, CREATININE, EGFRIFNONA, EGFRIFAFRI, BCR, K, CO2, CALCIUM, PROTENTOTREF, ALBUMIN, LABIL2, AST, ALT      Assessment    1. Right-sided chest lung mass  2. Home oxygen dependent COPD  3. Weight loss  4. ECOG 2    Plan:    1. Reviewed the CT scan and chest x-ray results with the patient and his family.  I will obtain CT-guided biopsy of it to be done by radiology.  I will obtain serum tumor markers today check CMP CEA and NSE  2. Continue to use oxygen and inhalers and nebulizer  3. Encouraged him to eat and keep his weight up  4. I will see him back in my office as soon as the biopsy is done.  Check CBC at the time.  I answered all the patient and his family's questions  to their satisfaction      I have reviewed labs results, imaging, vitals, and medications with the patient and family today.             Patient and family verbalized understanding and is in agreement of the above plan.        This report was compiled using Dragon voice recognition software. I have made every effort to proof read this document; however, typographical errors may persist.

## 2019-08-06 NOTE — TELEPHONE ENCOUNTER
Daughter, Patito called asking about status of biopsy and PET scan appointments. These were just ordered 8/1/19, two business days since patient seen. Informed Patito that insurance authorization is being obtained. Once approved, they will be contacted to schedule. Caller gave v/u. Her # is [phone].

## 2019-08-20 NOTE — DISCHARGE INSTRUCTIONS
A responsible adult should stay with you and you should rest quietly for the rest of the day. Do not drink alcohol, drive or cook for 24 hours following your procedure.  Progress your diet as tolerated.  Resume your usually medications including aspirin.  When you remove your dressing in 48 hours, a small amount of blood is to be expected. Do not be alarmed.  If you feel it is bleeding excessively apply pressure and proceed to the Emergency room.  Do not shower, bath, or get your dressing wet at all for 48 hours.  You may shower after the dressing is removed. No lifting more that 10 pounds for 48 hours.  If severe pain, increased shortness of air or racing heartbeat occur, seek immediate medical attention.  Follow up with Dr. Reilly for results.

## 2019-08-29 NOTE — PROGRESS NOTES
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  3 weeks prior to that he resented to the emergency room with shortness of air.  At that time a chest x-ray was obtained that was followed by a CT scan of the chest.  7/13/2019 CT scan of the chest without contrast was obtained which shows emphysematous changes of the bilateral lungs.  There is a pleural-based lobulated, partly necrotic mass in the posterior aspect of the right lower lobe measuring 8.5 x 7.2 x 4.2 cm.  She was sent to the [facility] and seen initially on 8/1/2019  · 8/14/2019 PET CT scan  1. Large hypermetabolic 10 cm mass in the right lower lobe with central  necrosis most concerning for pulmonary malignancy.  2. No hypermetabolic adenopathy in the chest or evidence of distant  metastatic disease.  3. Advanced emphysema.  4. Coronary atherosclerotic disease.  · 8/20/2019 CT-guided biopsy of the right lower lobe lung mass positive for invasive moderately differentiated squamous cell carcinoma.    2.  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 on 3 L oxygen all the time  Past Medical History:   Diagnosis Date   • Atrial fibrillation (CMS/HCC)    • COPD (chronic obstructive pulmonary disease) (CMS/HCC)     On continuous oxygen   • Pneumonia     History of pneumonia   • Psychiatric problem        Past Surgical History:   Procedure Laterality Date   • APPENDECTOMY     • SHOULDER SURGERY Left     x 2         Current Outpatient Medications:   •  albuterol (PROVENTIL) (5 MG/ML) 0.5% nebulizer solution, Take 2.5 mg by nebulization Every 6 (Six) Hours As Needed for Wheezing., Disp: , Rfl:   •  aspirin 81 MG EC tablet, Take 81 mg by mouth Daily., Disp: , Rfl:   •   atorvastatin (LIPITOR) 10 MG tablet, Take 10 mg by mouth Daily., Disp: , Rfl:   •  budesonide-formoterol (SYMBICORT) 160-4.5 MCG/ACT inhaler, Inhale 2 puffs 2 (Two) Times a Day., Disp: , Rfl:   •  ipratropium (ATROVENT) 0.02 % nebulizer solution, Take 500 mcg by nebulization 4 (Four) Times a Day., Disp: , Rfl:   •  metoprolol tartrate (LOPRESSOR) 25 MG tablet, Take 25 mg by mouth Daily., Disp: , Rfl: 1  •  Multiple Vitamins-Minerals (EYE VITAMINS PO), Take 1 capsule by mouth., Disp: , Rfl:     No Known Allergies    Family history: Hypertension    Social History     Tobacco Use   • Smoking status: Former Smoker     Packs/day: 2.00     Years: 53.00     Pack years: 106.00     Types: Cigarettes   • Smokeless tobacco: Current User     Types: Chew   Substance Use Topics   • Alcohol use: No     Frequency: Never   • Drug use: No       I have reviewed the history of present illness, past medical history, family history, social history, lab results, all notes and other records since the patient was last seen on 8/1/2019    SUBJECTIVE:    Mendoza is in my office for follow-up of his non-small cell lung cancer accompanied by his family.  Denies any chest pain no back pain.  Has cough no hemoptysis continues to use oxygen continuously at 3 L.      ROS:         Review of Systems   Constitutional: Negative for fever.   HENT: Negative for nosebleeds and trouble swallowing.    Eyes: Negative for visual disturbance.   Respiratory: Negative for cough, shortness of breath and wheezing.    Cardiovascular: Negative for chest pain.   Gastrointestinal: Negative for abdominal pain and blood in stool.   Endocrine: Negative for cold intolerance.   Genitourinary: Negative for dysuria and hematuria.   Musculoskeletal: Negative for joint swelling.   Skin: Negative for rash.   Allergic/Immunologic: Negative for environmental allergies.   Neurological: Negative for seizures.   Hematological: Does not bruise/bleed easily.   Psychiatric/Behavioral:  "The patient is not nervous/anxious.          Objective:       Vitals:    08/29/19 1208   BP: 131/68   Pulse: 91   Temp: 98.1 °F (36.7 °C)   Weight: 47.2 kg (104 lb)   Height: 165.1 cm (65\")         PHYSICAL EXAM:     Physical Exam   Constitutional: He is oriented to person, place, and time. No distress.   Cachectic   HENT:   Head: Normocephalic and atraumatic.   Eyes: Conjunctivae and EOM are normal. Right eye exhibits no discharge. Left eye exhibits no discharge. No scleral icterus.   Neck: Normal range of motion. Neck supple. No thyromegaly present.   Cardiovascular: Normal rate, regular rhythm and normal heart sounds. Exam reveals no gallop and no friction rub.   Pulmonary/Chest: Effort normal. No stridor. No respiratory distress. He has no wheezes.   Decreased breath sounds right chest left chest wheezing   Abdominal: Soft. Bowel sounds are normal. He exhibits no mass. There is no tenderness. There is no rebound and no guarding.   Musculoskeletal: Normal range of motion. He exhibits no tenderness.   Lymphadenopathy:     He has no cervical adenopathy.   Neurological: He is alert and oriented to person, place, and time. He exhibits normal muscle tone.   Skin: Skin is warm. No rash noted. He is not diaphoretic. No erythema.   Psychiatric: He has a normal mood and affect. His behavior is normal.   Nursing note and vitals reviewed.       RECENT LABS:     WBC   Date Value Ref Range Status   08/20/2019 11.90 (H) 3.40 - 10.80 10*3/mm3 Final   08/01/2019 9.7 3.4 - 10.8 x10E3/uL Final     RBC   Date Value Ref Range Status   08/20/2019 5.11 4.14 - 5.80 10*6/mm3 Final   08/01/2019 4.89 4.14 - 5.80 x10E6/uL Final     Hemoglobin   Date Value Ref Range Status   08/20/2019 14.4 13.0 - 17.7 g/dL Final     Hematocrit   Date Value Ref Range Status   08/20/2019 44.1 37.5 - 51.0 % Final     MCV   Date Value Ref Range Status   08/20/2019 86.4 79.0 - 97.0 fL Final     MCH   Date Value Ref Range Status   08/20/2019 28.2 26.6 - 33.0 pg " Final     MCHC   Date Value Ref Range Status   08/20/2019 32.6 31.5 - 35.7 g/dL Final     RDW   Date Value Ref Range Status   08/20/2019 14.8 12.3 - 15.4 % Final     RDW-SD   Date Value Ref Range Status   08/20/2019 45.5 37.0 - 54.0 fl Final     MPV   Date Value Ref Range Status   08/20/2019 8.3 6.0 - 12.0 fL Final     Platelets   Date Value Ref Range Status   08/20/2019 442 140 - 450 10*3/mm3 Final     Neutrophil %   Date Value Ref Range Status   08/20/2019 78.8 (H) 42.7 - 76.0 % Final     Lymphocyte %   Date Value Ref Range Status   08/20/2019 7.3 (L) 19.6 - 45.3 % Final     Monocyte %   Date Value Ref Range Status   08/20/2019 11.9 5.0 - 12.0 % Final     Eosinophil %   Date Value Ref Range Status   08/20/2019 1.5 0.3 - 6.2 % Final     Basophil %   Date Value Ref Range Status   08/20/2019 0.5 0.0 - 1.5 % Final     Neutrophils, Absolute   Date Value Ref Range Status   08/20/2019 9.40 (H) 1.70 - 7.00 10*3/mm3 Final     Lymphocytes, Absolute   Date Value Ref Range Status   08/20/2019 0.90 0.70 - 3.10 10*3/mm3 Final     Monocytes, Absolute   Date Value Ref Range Status   08/20/2019 1.40 (H) 0.10 - 0.90 10*3/mm3 Final     Eosinophils, Absolute   Date Value Ref Range Status   08/20/2019 0.20 0.00 - 0.40 10*3/mm3 Final     Basophils, Absolute   Date Value Ref Range Status   08/20/2019 0.10 0.00 - 0.20 10*3/mm3 Final     nRBC   Date Value Ref Range Status   08/20/2019 0.0 0.0 - 0.2 /100 WBC Final       Lab Results   Component Value Date    BUN 9 08/01/2019    CREATININE 0.56 (L) 08/01/2019    EGFRIFNONA 104 08/01/2019    EGFRIFAFRI 120 08/01/2019    BCR 16 08/01/2019    K 4.3 08/01/2019    CO2 28 08/01/2019    CALCIUM 9.0 08/01/2019    PROTENTOTREF 6.5 08/01/2019    ALBUMIN 3.8 08/01/2019    LABIL2 1.4 08/01/2019    AST 30 08/01/2019    ALT 31 08/01/2019         Assessment/Plan      ASSESSMENT:     1. c T4 N0 M0 non-small cell/squamous cell carcinoma of right lung  2. Home oxygen dependent COPD/severe emphysema  3. Weight  loss  4. ECOG 2    PLAN:      1. I discussed with the patient and his family about the PET CT scan findings and the pathology report.  Patient is not a surgical candidate given his severe emphysema and poor performance score I will consult radiation oncology for likely radiation therapy with concomitant chemotherapy.  An appointment will be made as soon as possible.  2. Continue to use oxygen no evidence of pneumonia at this time  3. Encourage the patient increase p.o. intake to gain some weight  4. I will see him back in my office next week when he sees radiation oncology    I have reviewed labs results, imaging, vitals, and medications with the patient and his family today.They all verbalized understanding and is in agreement of the above plan.          This report was compiled using Dragon voice recognition software. I have made every effort to proof read this document; however, typographical errors may persist.

## 2019-09-04 PROBLEM — C34.31 MALIGNANT NEOPLASM OF LOWER LOBE OF RIGHT LUNG (HCC): Status: ACTIVE | Noted: 2019-01-01

## 2019-09-04 NOTE — PROGRESS NOTES
Radiation Oncology Consult Note    Name: Mendoza Robert  YOB: 1945  MRN #: [id number]  Date of Service: 09/04/19  Referring Provider: Lorena Iyer MD  [address] [address], [place] [id number]  Primary Care Provider: Ubaldo Mcconnell MD      DIAGNOSIS: [id number] SCC of the Right lung    REASON FOR CONSULTATION/CHIEF COMPLAINT:  I was asked to see the patient at the request of the referring provider noted below for advice and recommendations regarding this diagnosis and the role of radiation therapy.                              REQUESTING PHYSICIAN:  Lorena Iyer Md  [address] [place] [address], [place] [id number]    RECORDS OBTAINED:  Records of the patients history including those obtained from the referring provider were reviewed and summarized in detail.    HISTORY OF PRESENT ILLNESS:  Mendoza Robert is a 73 y.o. male who presented to the ED with shortness of breath and abnormal CXR followed CT chest.  This study was on 07/13/19, CT scan of the chest without contrast was obtained which shows emphysematous changes of the bilateral lungs.  There is a pleural-based lobulated, partly necrotic mass in the posterior aspect of the right lower lobe measuring 8.5 x 7.2 x 4.2 cm.    He was sent to the cancer care center and seen initially on 8/1/2019 by Dr. Reilly and had PET/CT.    This study was on 8/14/2019 PET CT scan  1. Large hypermetabolic 10 cm mass in the right lower lobe with central  necrosis most concerning for pulmonary malignancy.  2. No hypermetabolic adenopathy in the chest or evidence of distant  metastatic disease.  3. Advanced emphysema.  4. Coronary atherosclerotic disease.  · 8/20/2019 CT-guided biopsy of the right lower lobe lung mass positive for invasive moderately differentiated squamous cell carcinoma.   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    The following portions  of the patient's history were reviewed and updated as appropriate: allergies, current medications, past family history, past medical history, past social history, past surgical history and problem list. Reviewed with the patient and remain unchanged.    PAST MEDICAL HISTORY:  he  has a past medical history of Atrial fibrillation (CMS/[facility]), COPD (chronic obstructive pulmonary disease) (CMS/[facility]), History of posttraumatic stress disorder (PTSD), and Pneumonia.  MEDICATIONS:   Current Outpatient Medications:   •  albuterol (PROVENTIL) (5 MG/ML) 0.5% nebulizer solution, Take 2.5 mg by nebulization Every 6 (Six) Hours As Needed for Wheezing., Disp: , Rfl:   •  aspirin 81 MG EC tablet, Take 81 mg by mouth Daily., Disp: , Rfl:   •  atorvastatin (LIPITOR) 10 MG tablet, Take 10 mg by mouth Daily., Disp: , Rfl:   •  budesonide-formoterol (SYMBICORT) 160-4.5 MCG/ACT inhaler, Inhale 2 puffs 2 (Two) Times a Day., Disp: , Rfl:   •  ipratropium (ATROVENT) 0.02 % nebulizer solution, Take 500 mcg by nebulization 4 (Four) Times a Day., Disp: , Rfl:   •  metoprolol tartrate (LOPRESSOR) 25 MG tablet, Take 25 mg by mouth Daily., Disp: , Rfl: 1  •  Multiple Vitamins-Minerals (EYE VITAMINS PO), Take 1 capsule by mouth., Disp: , Rfl:   •  dexamethasone (DECADRON) 4 MG tablet, Take 2 tablets in the morning daily on Days 2,3 and 4 and Days 9, 10 and 11.  Take with food., Disp: 12 tablet, Rfl: 1  •  docusate sodium (COLACE) 100 MG capsule, Take 1 capsule by mouth 2 (Two) Times a Day As Needed for Constipation., Disp: 60 capsule, Rfl: 3  •  HYDROcodone-acetaminophen (NORCO) 5-325 MG per tablet, Take 1 tablet by mouth Every 6 (Six) Hours As Needed for Moderate Pain ., Disp: 60 tablet, Rfl: 0  •  ondansetron (ZOFRAN) 8 MG tablet, Take 1 tablet by mouth 3 (Three) Times a Day As Needed for Nausea or Vomiting., Disp: 30 tablet, Rfl: 5  No current facility-administered medications for this visit.     Facility-Administered Medications Ordered in Other  Visits:   •  etoposide (TOPOSAR) 80 mg in sodium chloride 0.9 % 254 mL chemo IVPB, 50 mg/m2 (Treatment Plan Recorded), Intravenous, Once, Lorena Iyer MD, Last Rate: 270 mL/hr at 19 1410, 80 mg at 19 1410  •  heparin flush (porcine) 100 UNIT/ML injection 500 Units, 500 Units, Intravenous, PRN, Lorena Iyer MD  •  sodium chloride 0.9 % 1,000 mL with potassium chloride 20 mEq, magnesium sulfate 1 g infusion, 1,000 mL, Intravenous, Once, Lorena Iyer MD  •  sodium chloride 0.9 % flush 10 mL, 10 mL, Intravenous, PRN, Lorena Iyer MD  ALLERGIES: No Known Allergies  PAST SURGICAL HISTORY: he has a past surgical history that includes Appendectomy; Shoulder surgery (Left); and Lung biopsy (Right, 2019).  PREVIOUS RADIOTHERAPY OR CHEMOTHERAPY:No  FAMILY HISTORY: his family history includes Hypertension in his mother; No Known Problems in his brother, father, maternal grandfather, maternal grandmother, paternal grandfather, paternal grandmother, and sister.  SOCIAL HISTORY: he  reports that he has quit smoking. His smoking use included cigarettes. He has a 106.00 pack-year smoking history. His smokeless tobacco use includes chew. He reports that he does not drink alcohol or use drugs.  PAIN AND PAIN MANAGEMENT: Pain 0/10.  Vitals:    19 0932   BP: 113/63   Pulse: 77   Resp: 18   Temp: 98.3 °F (36.8 °C)   TempSrc: Oral   SpO2: 94%   Weight: 47.7 kg (105 lb 3.2 oz)   PainSc: 0-No pain     NUTRITIONAL STATUS:  Otherwise no issues  KPS: 70  ECO    Review of Systems:   Review of Systems   Constitutional: Positive for unexpected weight change.        Patient has experienced an unintended weight loss of 5 lbs in the last 2 months.   Respiratory: Positive for cough and shortness of breath.         Patient states both symptoms are related to COPD and are not unusual for him.   Otherwise negative as below.   Skin: No rashes or jaundice.  HEENT: No change in vision or  hearing, no headaches.  Neck: No dysphagia or masses.  Heme/Lymph: No easy bruising or bleeding.  Cardiovascular: No chest pain, palpitations, or dyspnea on exertion.  - Pacemaker. GI: No nausea, vomiting, diarrhea, melena, or hematochezia.  : No dysuria or hematuria.  Endocrine: No heat or cold intolerance. Musculoskeletal: No myalgias or arthralgias.  Neuro: No weakness, numbness, syncope, or seizures. Psych: No mood changes or depression. Ext: Denies swelling.        Objective     Vitals:  Vitals:    09/04/19 0932   BP: 113/63   Pulse: 77   Resp: 18   Temp: 98.3 °F (36.8 °C)   TempSrc: Oral   SpO2: 94%   Weight: 47.7 kg (105 lb 3.2 oz)   PainSc: 0-No pain         PHYSICAL EXAM:  GENERAL: in no apparent distress, sitting comfortably in room.    HEENT: normocephalic, atraumatic. Pupils are equal, round, reactive to light. Sclera anicteric. Conjunctiva not injected. Oropharynx without erythema, ulcerations or thrush.   NECK: Supple with no masses.  LYMPHATIC: no cervical, supraclavicular or axillary adenopathy appreciated bilaterally.   CARDIOVASCULAR: S1 & S2 detected; no murmurs, rubs or gallops.  CHEST: clear to auscultation bilaterally; no wheezes, crackles or rubs. Work of breathing normal.  ABDOMEN: bowel sounds present. Abdomen is soft, nontender, nondistended.   MUSCULOSKELETAL: no tenderness to palpation along the spine or scapulae. Normal range of motion.  EXTREMITIES: no clubbing, cyanosis, edema.  SKIN: no erythema, rashes, ulcerations noted.   NEUROLOGIC: cranial nerves II-XII grossly intact bilaterally. No focal neurologic deficits.  PSYCHIATRIC:  alert, aware, and appropriate.    PERTINENT IMAGING/PATHOLOGY/LABS (Medical Decision Making):     COORDINATION OF CARE: A copy of this note is sent to the referring provider.    PATHOLOGY (Reviewed): reviewed as noted above.    IMAGING (Reviewed): Independent review of imaging as noted.    LABS (Reviewed):  Hematology WBC   Date Value Ref Range Status    09/16/2019 10.71 3.40 - 10.80 10*3/mm3 Final   08/01/2019 9.7 3.4 - 10.8 x10E3/uL Final     RBC   Date Value Ref Range Status   09/16/2019 4.55 4.14 - 5.80 10*6/mm3 Final   08/01/2019 4.89 4.14 - 5.80 x10E6/uL Final     Hemoglobin   Date Value Ref Range Status   09/16/2019 12.7 (L) 13.0 - 17.7 g/dL Final     Hematocrit   Date Value Ref Range Status   09/16/2019 40.2 37.5 - 51.0 % Final     Platelets   Date Value Ref Range Status   09/16/2019 366 140 - 450 10*3/mm3 Final      Chemistry   Lab Results   Component Value Date    GLUCOSE 108 (H) 09/16/2019    BUN 13 09/16/2019    CREATININE 0.40 (L) 09/16/2019    EGFRIFNONA >150 09/16/2019    EGFRIFAFRI 120 08/01/2019    BCR 26.0 (H) 09/16/2019    K 4.0 09/16/2019    CO2 31.0 09/16/2019    CALCIUM 8.9 09/16/2019    PROTENTOTREF 6.5 08/01/2019    ALBUMIN 2.90 (L) 09/16/2019    LABIL2 1.4 08/01/2019    AST 24 09/16/2019    ALT 23 09/16/2019       Assessment/Plan     ASSESSMENT AND PLAN:  1. Malignant neoplasm of lower lobe of right lung (CMS/HCC)    [id number]  -baseline COPD/severe emphysema.  Has already met with Dr. Reilly who has recommended ChemoXRT +/- adjuvant Darvulamab  -CT simulation needed for 4dCT and will plan for IMRT/IGRT.  NCCN guidelines reviewed with the patient. Lesion too big for ablative dosing/length of CW invasion to large.    Ordered placed.  Rationale/risks/benefits and alternatives explained in full detail.       TIME SPENT WITH PATIENT:   I spent greater than 60 minutes in face-to-face time with the patient and greater than 50% of that time was spent in counseling and coordination of care, including review of imaging and pathology; indications, goals, logistics, alternatives and risks - both common and rare - for my recommendations as well as surveillance and potential outcomes.         CC: Lorena Iyer MD Matlock, David, MD          Encounter Approved by:     Marquis Gilbert MD  09/16/19  2:21 PM

## 2019-09-04 NOTE — PROGRESS NOTES
Hematology/Oncology Outpatient Follow Up    Mendoza Robert  1945    Primary Care Physician: Ubaldo Mcconnell MD  Referring Physician: Seamus Paige MD    Chief complaint:  cT4 cN0 M0 Non-small cell/squamous cell carcinoma of the lung    History of Present Illness:   Patient is in my office for initial visit on 8/1/2019.  3 weeks prior to that he resented to the emergency room with shortness of air.  At that time a chest x-ray was obtained that was followed by a CT scan of the chest.  7/13/2019 CT scan of the chest without contrast was obtained which shows emphysematous changes of the bilateral lungs.  There is a pleural-based lobulated, partly necrotic mass in the posterior aspect of the right lower lobe measuring 8.5 x 7.2 x 4.2 cm.  She was sent to the cancer [facility] and seen initially on 8/1/2019  · 8/14/2019 PET CT scan  1. Large hypermetabolic 10 cm mass in the right lower lobe with central  necrosis most concerning for pulmonary malignancy.  2. No hypermetabolic adenopathy in the chest or evidence of distant  metastatic disease.  3. Advanced emphysema.  4. Coronary atherosclerotic disease.  · 8/20/2019 CT-guided biopsy of the right lower lobe lung mass positive for invasive moderately differentiated squamous cell carcinoma.    2.  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 on 3 L oxygen all the time    Past Medical History:   Diagnosis Date   • Atrial fibrillation (CMS/HCC)    • COPD (chronic obstructive pulmonary disease) (CMS/HCC)     On continuous oxygen   • History of posttraumatic stress disorder (PTSD)    • Pneumonia     History of pneumonia       Past Surgical History:   Procedure Laterality Date   • APPENDECTOMY     • LUNG BIOPSY Right 08/20/2019   • SHOULDER SURGERY Left     x 2         Current Outpatient Medications:   •  albuterol (PROVENTIL) (5 MG/ML) 0.5% nebulizer solution, Take 2.5 mg by nebulization Every 6 (Six) Hours As Needed for Wheezing., Disp: , Rfl:   •  aspirin 81  MG EC tablet, Take 81 mg by mouth Daily., Disp: , Rfl:   •  atorvastatin (LIPITOR) 10 MG tablet, Take 10 mg by mouth Daily., Disp: , Rfl:   •  budesonide-formoterol (SYMBICORT) 160-4.5 MCG/ACT inhaler, Inhale 2 puffs 2 (Two) Times a Day., Disp: , Rfl:   •  ipratropium (ATROVENT) 0.02 % nebulizer solution, Take 500 mcg by nebulization 4 (Four) Times a Day., Disp: , Rfl:   •  metoprolol tartrate (LOPRESSOR) 25 MG tablet, Take 25 mg by mouth Daily., Disp: , Rfl: 1  •  Multiple Vitamins-Minerals (EYE VITAMINS PO), Take 1 capsule by mouth., Disp: , Rfl:     No Known Allergies    Family history: Hypertension    Social History     Tobacco Use   • Smoking status: Former Smoker     Packs/day: 2.00     Years: 53.00     Pack years: 106.00     Types: Cigarettes   • Smokeless tobacco: Current User     Types: Chew   Substance Use Topics   • Alcohol use: No     Frequency: Never   • Drug use: No       I have reviewed the history of present illness, past medical history, family history, social history, lab results, all notes and other records since the patient was last seen on 8/29/2019    SUBJECTIVE:    Mendoza is in my office for follow-up of his non-small cell lung cancer accompanied by his family. He was seen by rad onc today.  Denies any chest pain no back pain.  Has cough,  no hemoptysis continues to use oxygen continuously at 3 L.      ROS:     Review of Systems   Constitutional: Negative for fever.   HENT: Negative for nosebleeds and trouble swallowing.    Eyes: Negative for visual disturbance.   Respiratory: Negative for cough, shortness of breath and wheezing.    Cardiovascular: Negative for chest pain.   Gastrointestinal: Negative for abdominal pain and blood in stool.   Endocrine: Negative for cold intolerance.   Genitourinary: Negative for dysuria and hematuria.   Musculoskeletal: Negative for joint swelling.   Skin: Negative for rash.   Allergic/Immunologic: Negative for environmental allergies.   Neurological: Negative  "for seizures.   Hematological: Does not bruise/bleed easily.   Psychiatric/Behavioral: The patient is not nervous/anxious.          Objective:       Vitals:    09/04/19 1044   BP: 120/68   Pulse: 82   Resp: 18   Temp: 98.4 °F (36.9 °C)   Weight: 47.9 kg (105 lb 9.6 oz)   Height: 165.1 cm (65\")   PainSc: 0-No pain         PHYSICAL EXAM:     Physical Exam   Constitutional: He is oriented to person, place, and time. No distress.   Cachectic   HENT:   Head: Normocephalic and atraumatic.   Eyes: Conjunctivae and EOM are normal. Right eye exhibits no discharge. Left eye exhibits no discharge. No scleral icterus.   Neck: Normal range of motion. Neck supple. No thyromegaly present.   Cardiovascular: Normal rate, regular rhythm and normal heart sounds. Exam reveals no gallop and no friction rub.   Pulmonary/Chest: Effort normal. No stridor. No respiratory distress. He has no wheezes.   Decreased breath sounds right chest left chest wheezing   Abdominal: Soft. Bowel sounds are normal. He exhibits no mass. There is no tenderness. There is no rebound and no guarding.   Musculoskeletal: Normal range of motion. He exhibits no tenderness.   Lymphadenopathy:     He has no cervical adenopathy.   Neurological: He is alert and oriented to person, place, and time. He exhibits normal muscle tone.   Skin: Skin is warm. No rash noted. He is not diaphoretic. No erythema.   Psychiatric: He has a normal mood and affect. His behavior is normal.   Nursing note and vitals reviewed.       RECENT LABS:     WBC   Date Value Ref Range Status   09/04/2019 11.23 (H) 3.40 - 10.80 10*3/mm3 Final   08/01/2019 9.7 3.4 - 10.8 x10E3/uL Final     RBC   Date Value Ref Range Status   09/04/2019 4.99 4.14 - 5.80 10*6/mm3 Final   08/01/2019 4.89 4.14 - 5.80 x10E6/uL Final     Hemoglobin   Date Value Ref Range Status   09/04/2019 14.2 13.0 - 17.7 g/dL Final     Hematocrit   Date Value Ref Range Status   09/04/2019 44.4 37.5 - 51.0 % Final     MCV   Date Value " Ref Range Status   09/04/2019 89.0 79.0 - 97.0 fL Final     MCH   Date Value Ref Range Status   09/04/2019 28.5 26.6 - 33.0 pg Final     MCHC   Date Value Ref Range Status   09/04/2019 32.0 31.5 - 35.7 g/dL Final     RDW   Date Value Ref Range Status   09/04/2019 15.1 12.3 - 15.4 % Final     RDW-SD   Date Value Ref Range Status   09/04/2019 48.9 37.0 - 54.0 fl Final     MPV   Date Value Ref Range Status   09/04/2019 9.9 6.0 - 12.0 fL Final     Platelets   Date Value Ref Range Status   09/04/2019 390 140 - 450 10*3/mm3 Final     Neutrophil %   Date Value Ref Range Status   09/04/2019 83.5 (H) 42.7 - 76.0 % Final     Lymphocyte %   Date Value Ref Range Status   09/04/2019 5.4 (L) 19.6 - 45.3 % Final     Monocyte %   Date Value Ref Range Status   09/04/2019 9.7 5.0 - 12.0 % Final     Eosinophil %   Date Value Ref Range Status   09/04/2019 1.0 0.3 - 6.2 % Final     Basophil %   Date Value Ref Range Status   09/04/2019 0.4 0.0 - 1.5 % Final     Neutrophils, Absolute   Date Value Ref Range Status   09/04/2019 9.38 (H) 1.70 - 7.00 10*3/mm3 Final     Lymphocytes, Absolute   Date Value Ref Range Status   09/04/2019 0.61 (L) 0.70 - 3.10 10*3/mm3 Final     Monocytes, Absolute   Date Value Ref Range Status   09/04/2019 1.09 (H) 0.10 - 0.90 10*3/mm3 Final     Eosinophils, Absolute   Date Value Ref Range Status   09/04/2019 0.11 0.00 - 0.40 10*3/mm3 Final     Basophils, Absolute   Date Value Ref Range Status   09/04/2019 0.04 0.00 - 0.20 10*3/mm3 Final     nRBC   Date Value Ref Range Status   08/20/2019 0.0 0.0 - 0.2 /100 WBC Final       Lab Results   Component Value Date    BUN 9 08/01/2019    CREATININE 0.56 (L) 08/01/2019    EGFRIFNONA 104 08/01/2019    EGFRIFAFRI 120 08/01/2019    BCR 16 08/01/2019    K 4.3 08/01/2019    CO2 28 08/01/2019    CALCIUM 9.0 08/01/2019    PROTENTOTREF 6.5 08/01/2019    ALBUMIN 3.8 08/01/2019    LABIL2 1.4 08/01/2019    AST 30 08/01/2019    ALT 31 08/01/2019       ASSESSMENT:     1. c T4 N0 M0  non-small cell/squamous cell carcinoma of right lung  2. Home oxygen dependent COPD/severe emphysema  3. Weight loss  4. ECOG 2    PLAN:      1. Mendoza was seen by radiation oncology today with the plan to start radiation therapy within the next 10 days.  Will plan to start systemic chemotherapy also along with radiation.  Chemotherapy will be with cisplatin and -16.  2.  Continue to use oxygen no evidence of pneumonia at this time  3. Encourage the patient increase p.o. intake to gain some weight  4. Arrange for port placement with IR  5. Set up chemotherapy teaching  6. Start chemotherapy the same day of starting radiation treatments  7. I will see him back in my office in 4 weeks recheck CBC and CMP at the time    I have reviewed labs results, imaging, vitals, and medications with the patient and his family today.They all verbalized understanding and is in agreement of the above plan.          This report was compiled using Dragon voice recognition software. I have made every effort to proof read this document; however, typographical errors may persist.

## 2019-09-10 NOTE — DISCHARGE INSTRUCTIONS
Pt given printed discharge instructions.  Appointments for dressing changes noted in D/C instructions. Daughter present when D/C instructions given.

## 2019-09-10 NOTE — NURSING NOTE
Dressing placed.  Betadine, steri strips, 4x4 and tegaderm over port.  Betadine, 2x2 and tegaderm over neck puncture.

## 2019-09-11 NOTE — TELEPHONE ENCOUNTER
Attempted to contact Patito regarding pain medication and stool softener but was not able to reach her.  Does not have VM. Tonia, tracya

## 2019-09-11 NOTE — TELEPHONE ENCOUNTER
Received call from Patito.  She states patient is in severe pain from the lung cancer.  She states he saw Dr. Reilly on 9-4-19 but at that time refused pain medication because he was not hurting.  Dr. Reilly instructed him to call if he started hurting and she would give him something.  Patient with c T4 N0 M0 non-small cell/squamous cell carcinoma of right lung.  tracy Randalla

## 2019-09-16 NOTE — PROGRESS NOTES
[facility] Medical Oncology     Education for Administration of Chemotherapy and/or Biotherapy     09/16/19    Mendoza Robert  [id number]    Mr.Daniel PAOLO Robert is here today for education on their upcoming Chemotherapy and/or Biotherapy.     I will be going over their treatment options, obtain signed consent and answer any questions that they may have in regards to the administration of Cisplatin, Etoposide.    Mendoza Robert has already consulted with Dr.Yasoda Reilly for the treatment of Malignant Neoplasm of lower lobe of right lung. The provider has gone over the same treatment options with the patient and answered their question prior to today's visit.   The goal of the treatment is to:    [x] Cure my cancer - means treatment that kills cancer cells to the point my doctor                                     cannot find them in my body and they will not grow back.    [] Control my cancer - means treatment that keeps cancer from spreading or growing.    [] Relieve my cancer symptoms - means treatment that helps problems such as pain or                                     pressure.     This treatment has been explained to Mendoza Robert. Alternative methods of treatment, if any, have been explained to Mendoza PAOLO Robert as have the benefits and risks of each. Based on the physician's explanation of the benefits and risks of this treatment and any alternatives available, The patient agrees that the potential benefit's out weighs the risks involved. I have explained to the patient the most likely complications that might occur from this treatment. The patient understands that along with the treatment additional medications may be necessary to lesson the side effects. Possible side effect may include but are not limited to, any of the following, or a combination of the following:      Allergic Reaction Vision/Eye Changes Sexual Effects    []  High Blood Pressure  []  Skin and nail changes  []  Menopausal symptoms    [x]  Hearing Loss []  Ulceration at injection site []  Menstrual irregularities   [x]  Fatigue []  Skin rash   []  Fertility effects   [x]  Constipation  [x]  Diarrhea [x]  Hair loss  [x]  Light/temperature sensitivity []  Heart damage  []  Liver damage   [x]  Loss of appetite []  Dizziness []  Lung damage   [x]  Mouth Sores []  Muscle aching or weakness [x]  Kidney damage   []  Taste Changes []  Forgetfulness [x]  Nerve damage   []  Nausea or Vomiting []  Risk of blood clots []  Weight gain/loss   []  Secondary malignancies [x]  Risk of anemia  [x]  Risk of bleeding/bruising      While receiving treatment, it has been explained to the patient with regards to their blood counts. This may include but not limited to CBC, Neutropenia ,Anemia, Thrombocytopenia. This handout has been explained and given to the patient.     It was explained to the patient about nutrition and how important it is while undergoing Chemotherapy and/or Biotherapy. Certain medications will be prescribed during the treatment which may change the way foods taste or smell. These changes may cause poor or no appetite. Food is fuel for your body, and if it does not get the fuel it needs, your body may become mal-nourished, which can lead to sever fatigue.It was discussed with the patient about calories and how to add high-calorie foods to their diet.  Protein was also mentioned in regards to how this will help make new cells for the body. Information was given to Mendoza Robert in regards to some good protein sources.   We also discussed with the patient how important it was to drink/eat every 2-3 hours while awake. We discussed fluid intake of at least 6 8 ounce glassed of liquids per day to stay hydrated. Some of those are listed below:     Water  Juice (fruit or vegetable)  Soda Sport Drinks Soup   Milk  Ensure, Boost, Glucerna Ice Cream Popsicles Jello   Milkshakes Pudding  Gatorade Sherbert Yogurt     It has been discussed with the patient  the risks of becoming pregnant while receiving Chemotherapy and/or Biotherapy. We also discussed the importance of using reliable barrier methods while participating in intimate activities as this may expose their partners to a potentially harmful drug.     Further home instructions were given to the patient in regards to symptoms, treatment and how to handle those situations as well as when to contact the treatment team or the providers office.     Mendoza Robert was given handouts on:   1. Complete Blood Counts and terminology  2. Nutrition during Cancer Therapy   3. Home Instructions  4. Cisplatin and Etoposide from [employer]    I have discussed and gone over the full consent with the patient and answered all their questions regarding the medication they are to receive.  Written information has been provided and reviewed with Mendoza Robert. The patient and their family had a chance to ask any questions about the treatment medications and are satisfied with the information that was provided to them.     The patient has read and completed the consent form. They understand the possible risks and benefits of the recommended treatment plan and voluntarily agree to undergo the planned treatment. Should they change their mind and decide to stop treatment at any time, they will notify the providers office.       Aria Shields RN  09/16/2019  4:59 PM

## 2019-10-02 NOTE — PROGRESS NOTES
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  3 weeks prior to that he resented to the emergency room with shortness of air.  At that time a chest x-ray was obtained that was followed by a CT scan of the chest.  7/13/2019 CT scan of the chest without contrast was obtained which shows emphysematous changes of the bilateral lungs.  There is a pleural-based lobulated, partly necrotic mass in the posterior aspect of the right lower lobe measuring 8.5 x 7.2 x 4.2 cm.  She was sent to the [facility] and seen initially on 8/1/2019  · 8/14/2019 PET CT scan  1. Large hypermetabolic 10 cm mass in the right lower lobe with central  necrosis most concerning for pulmonary malignancy.  2. No hypermetabolic adenopathy in the chest or evidence of distant  metastatic disease.  3. Advanced emphysema.  4. Coronary atherosclerotic disease.  · 8/20/2019 CT-guided biopsy of the right lower lobe lung mass positive for invasive moderately differentiated squamous cell carcinoma.  · 9/16/2019 patient was initiated on concomitant chemoradiation therapy.  The chemotherapy with cisplatin and etoposide.  · 10/1/2019 CT scan of the head   1. No evidence of intracranial metastatic disease.   2. No acute intracranial findings.   3. Mild chronic microvascular disease  2.  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 on 3 L oxygen all the time    Past Medical History:   Diagnosis Date   • Atrial fibrillation (CMS/HCC)    • COPD (chronic obstructive pulmonary disease) (CMS/HCC)     On continuous oxygen   • History of posttraumatic stress disorder (PTSD)    • Pneumonia     History of pneumonia       Past Surgical History:   Procedure Laterality Date   •  APPENDECTOMY     • LUNG BIOPSY Right 08/20/2019   • SHOULDER SURGERY Left     x 2         Current Outpatient Medications:   •  albuterol (PROVENTIL) (5 MG/ML) 0.5% nebulizer solution, Take 2.5 mg by nebulization Every 6 (Six) Hours As Needed for Wheezing., Disp: , Rfl:   •  aspirin 81 MG EC tablet, Take 81 mg by mouth Daily., Disp: , Rfl:   •  atorvastatin (LIPITOR) 10 MG tablet, Take 10 mg by mouth Daily., Disp: , Rfl:   •  budesonide-formoterol (SYMBICORT) 160-4.5 MCG/ACT inhaler, Inhale 2 puffs 2 (Two) Times a Day., Disp: , Rfl:   •  dexamethasone (DECADRON) 4 MG tablet, Take 2 tablets in the morning daily on Days 2,3 and 4 and Days 9, 10 and 11.  Take with food., Disp: 12 tablet, Rfl: 1  •  docusate sodium (COLACE) 100 MG capsule, Take 1 capsule by mouth 2 (Two) Times a Day As Needed for Constipation., Disp: 60 capsule, Rfl: 3  •  HYDROcodone-acetaminophen (NORCO) 5-325 MG per tablet, Take 1 tablet by mouth Every 6 (Six) Hours As Needed for Moderate Pain ., Disp: 60 tablet, Rfl: 0  •  ipratropium (ATROVENT) 0.02 % nebulizer solution, Take 500 mcg by nebulization 4 (Four) Times a Day., Disp: , Rfl:   •  metoprolol tartrate (LOPRESSOR) 25 MG tablet, Take 25 mg by mouth Daily., Disp: , Rfl: 1  •  Multiple Vitamins-Minerals (EYE VITAMINS PO), Take 1 capsule by mouth., Disp: , Rfl:   •  ondansetron (ZOFRAN) 8 MG tablet, Take 1 tablet by mouth 3 (Three) Times a Day As Needed for Nausea or Vomiting., Disp: 30 tablet, Rfl: 5  •  promethazine (PHENERGAN) 12.5 MG tablet, Take 1 tablet by mouth Every 6 (Six) Hours As Needed for Nausea or Vomiting., Disp: 30 tablet, Rfl: 2  No current facility-administered medications for this visit.     No Known Allergies    Family history: Hypertension    Social History     Tobacco Use   • Smoking status: Former Smoker     Packs/day: 2.00     Years: 53.00     Pack years: 106.00     Types: Cigarettes   • Smokeless tobacco: Current User     Types: Chew   Substance Use Topics   • Alcohol  "use: No     Frequency: Never   • Drug use: No       I have reviewed the history of present illness, past medical history, family history, social history, lab results, all notes and other records since the patient was last seen on 8/29/2019    SUBJECTIVE:    Menodza is in my office for follow-up of his non-small cell lung cancer accompanied by his family.  Tolerating chemoradiation relatively well.  Has reports to fatigue.  Continues to use oxygen.  Has dry cough no hemoptysis    ROS:     Review of Systems   Constitutional: Negative for fever.   HENT: Negative for nosebleeds and trouble swallowing.    Eyes: Negative for visual disturbance.   Respiratory: Negative for cough, shortness of breath and wheezing.    Cardiovascular: Negative for chest pain.   Gastrointestinal: Negative for abdominal pain and blood in stool.   Endocrine: Negative for cold intolerance.   Genitourinary: Negative for dysuria and hematuria.   Musculoskeletal: Negative for joint swelling.   Skin: Negative for rash.   Allergic/Immunologic: Negative for environmental allergies.   Neurological: Negative for seizures.   Hematological: Does not bruise/bleed easily.   Psychiatric/Behavioral: The patient is not nervous/anxious.          Objective:       Vitals:    10/02/19 0926   BP: 105/62   Pulse: 80   Resp: 16   Temp: 98.4 °F (36.9 °C)   Weight: 47.6 kg (105 lb)   Height: 165.1 cm (65\")   PainSc: 0-No pain         PHYSICAL EXAM:     Physical Exam   Constitutional: He is oriented to person, place, and time. No distress.   Cachectic   HENT:   Head: Normocephalic and atraumatic.   Eyes: Conjunctivae and EOM are normal. Right eye exhibits no discharge. Left eye exhibits no discharge. No scleral icterus.   Neck: Normal range of motion. Neck supple. No thyromegaly present.   Cardiovascular: Normal rate, regular rhythm and normal heart sounds. Exam reveals no gallop and no friction rub.   Pulmonary/Chest: Effort normal. No stridor. No respiratory distress. " He has no wheezes.   Decreased breath sounds right chest left chest wheezing   Abdominal: Soft. Bowel sounds are normal. He exhibits no mass. There is no tenderness. There is no rebound and no guarding.   Musculoskeletal: Normal range of motion. He exhibits no tenderness.   Lymphadenopathy:     He has no cervical adenopathy.   Neurological: He is alert and oriented to person, place, and time. He exhibits normal muscle tone.   Skin: Skin is warm. No rash noted. He is not diaphoretic. No erythema.   Psychiatric: He has a normal mood and affect. His behavior is normal.   Nursing note and vitals reviewed.       RECENT LABS:     WBC   Date Value Ref Range Status   10/02/2019 2.69 (L) 3.40 - 10.80 10*3/mm3 Final   08/01/2019 9.7 3.4 - 10.8 x10E3/uL Final     RBC   Date Value Ref Range Status   10/02/2019 4.34 4.14 - 5.80 10*6/mm3 Final   08/01/2019 4.89 4.14 - 5.80 x10E6/uL Final     Hemoglobin   Date Value Ref Range Status   10/02/2019 12.1 (L) 13.0 - 17.7 g/dL Final     Hematocrit   Date Value Ref Range Status   10/02/2019 37.3 (L) 37.5 - 51.0 % Final     MCV   Date Value Ref Range Status   10/02/2019 85.9 79.0 - 97.0 fL Final     MCH   Date Value Ref Range Status   10/02/2019 27.9 26.6 - 33.0 pg Final     MCHC   Date Value Ref Range Status   10/02/2019 32.4 31.5 - 35.7 g/dL Final     RDW   Date Value Ref Range Status   10/02/2019 14.1 12.3 - 15.4 % Final     RDW-SD   Date Value Ref Range Status   10/02/2019 43.0 37.0 - 54.0 fl Final     MPV   Date Value Ref Range Status   10/02/2019 9.8 6.0 - 12.0 fL Final     Platelets   Date Value Ref Range Status   10/02/2019 209 140 - 450 10*3/mm3 Final     Neutrophil %   Date Value Ref Range Status   10/02/2019 78.5 (H) 42.7 - 76.0 % Final     Lymphocyte %   Date Value Ref Range Status   10/02/2019 3.3 (L) 19.6 - 45.3 % Final     Monocyte %   Date Value Ref Range Status   10/02/2019 17.8 (H) 5.0 - 12.0 % Final     Eosinophil %   Date Value Ref Range Status   10/02/2019 0.0 (L) 0.3  - 6.2 % Final     Basophil %   Date Value Ref Range Status   10/02/2019 0.4 0.0 - 1.5 % Final     Neutrophils, Absolute   Date Value Ref Range Status   10/02/2019 2.11 1.70 - 7.00 10*3/mm3 Final     Lymphocytes, Absolute   Date Value Ref Range Status   10/02/2019 0.09 (L) 0.70 - 3.10 10*3/mm3 Final     Monocytes, Absolute   Date Value Ref Range Status   10/02/2019 0.48 0.10 - 0.90 10*3/mm3 Final     Eosinophils, Absolute   Date Value Ref Range Status   10/02/2019 0.00 0.00 - 0.40 10*3/mm3 Final     Basophils, Absolute   Date Value Ref Range Status   10/02/2019 0.01 0.00 - 0.20 10*3/mm3 Final     nRBC   Date Value Ref Range Status   09/10/2019 0.2 0.0 - 0.2 /100 WBC Final       Lab Results   Component Value Date    GLUCOSE 134 (H) 09/30/2019    BUN 12 09/30/2019    CREATININE 0.40 (L) 09/30/2019    EGFRIFNONA >150 09/30/2019    EGFRIFAFRI 120 08/01/2019    BCR 30.0 (H) 09/30/2019    K 4.0 09/30/2019    CO2 32.0 09/30/2019    CALCIUM 8.4 (L) 09/30/2019    PROTENTOTREF 6.5 08/01/2019    ALBUMIN 2.70 (L) 09/30/2019    LABIL2 1.4 08/01/2019    AST 22 09/30/2019    ALT 32 09/30/2019       ASSESSMENT:     1. c T4 N0 M0 non-small cell/squamous cell carcinoma of right lung  2. Home oxygen dependent COPD/severe emphysema  3. Weight loss  4. Chemo-induced cytopenia  5. ECOG 2    PLAN:      1. Patient is tolerating chemoradiation therapy poorly with side effects.  Continue the same treatment plan at this time given that we are treating him in curative fashion.  2.  Continue to use oxygen at home as previously set  3. Encourage the patient increase p.o. intake to gain weight  4. Reviewed the CT head which was negative for mets  5. He has chemotherapy-induced leukopenia will observe at this time growth factors not indicated.  6. I will see him back in my office in 4 weeks recheck CBC and CMP at the time  7. Patient will receive flu shot when he comes for his chemotherapy    I have reviewed labs results, imaging, vitals, and  medications with the patient and his family today.They all verbalized understanding and is in agreement of the above plan.          This report was compiled using Dragon voice recognition software. I have made every effort to proof read this document; however, typographical errors may persist.

## 2019-10-08 NOTE — TELEPHONE ENCOUNTER
Called and spoke with patient advising him of low mag level and that NP called in magnesium Rx to his pharmacy. Patient gave v/u.

## 2019-10-14 NOTE — PROGRESS NOTES
Received orders from Niyah Rodriguez NP today for pt to have chest xray after visit for increased sputum. Also patient to see wound care for multiple stage 1 and 2 wounds on his coccyx and lower left buttock.     Family and pt questioned me why he was getting  this week when Dr. Reilly mentioned he would get  days 1-5 on week 6 of treatment. Careplan and current orders in the computer didn't match this. Orders were clarified for pt to get cisplatin only this week and to start  days 1-5 next week which is week 6.

## 2019-10-15 NOTE — TELEPHONE ENCOUNTER
----- Message from GISELLA Alanis sent at 10/14/2019  2:37 PM EDT -----  Clinical Pool (Dev)     Please advise pt to increase mag ox from 400 mg once daily to BID.     Please also verify if patient is having diarrhea or intolerance to current dose.     Electronically signed by GISELLA Alanis, 10/14/19, 2:37 PM.

## 2019-10-16 PROBLEM — E86.0 DEHYDRATION: Status: ACTIVE | Noted: 2019-01-01

## 2019-10-16 PROBLEM — R53.1 WEAKNESS: Status: ACTIVE | Noted: 2019-01-01

## 2019-10-21 PROBLEM — E83.42 HYPOMAGNESEMIA: Status: ACTIVE | Noted: 2019-01-01

## 2019-10-21 NOTE — PROGRESS NOTES
Case Management/ Note    Patient Name: Mendoza Robert  YOB: 1945  MRN #: [id number]    OSW met with patient and his fiance, Patito at the request of ARSEN Harp as patient has physical symptoms that may be related more to stress than treatment. He is alert and oriented to person, place and time. He states he does not feel well as he has several physical complaints including nausea, upset stomach and fatigue. He said he has a history of PTSD from his experience in [place]. He said he still has nightmares, flashbacks and dissociative symptoms. He feels these symptoms are not any worse than usual and he denies SI / HI. We discussed symptoms of stress and he admits that his diagnosis and treatment have been more stressful than what he is used to and that he is bothered by this. He said [facility] but [facility] not [facility] long as he did not like this. He has also tried medication but does not feel this is helpful. Discussed with him about going to [facility] for EMDR and explained this mental health modality for treatment of PTSS. He is open to doing this but did not want to call together. Patito was given the referral name and number and he said he would call this week. OSW assisted them to radiation oncology via wheelchair. He requested to be taken to the [facility] to wait for his radiation appointment.     Electronically signed by:   Genoveva Walters LCSW, OSW-C  10/21/19, 3:55 PM

## 2019-10-25 NOTE — PROGRESS NOTES
Pt c/o overall fatigue, weakness,  Reviewed with Dr Reilly and pt to receive IVF and have CBC drawn. Reviewed CBC results with Dr Reilly and pt to begin Levaquin for 7 days,  Script sent to pharmacy. Notified pt/sig other to notify office for temp above 100.4/ problems/issues.  Reviewed with pt/sig other and v/u and agreement.

## 2019-10-28 PROBLEM — D70.1 CHEMOTHERAPY INDUCED NEUTROPENIA (HCC): Status: ACTIVE | Noted: 2019-01-01

## 2019-10-28 PROBLEM — T45.1X5A CHEMOTHERAPY INDUCED NEUTROPENIA (HCC): Status: ACTIVE | Noted: 2019-01-01

## 2019-10-29 PROBLEM — R11.0 NAUSEA: Status: ACTIVE | Noted: 2019-01-01

## 2019-10-29 NOTE — PROGRESS NOTES
Patient and family states home health nurse states that patient needs IV fluids due to low B/P. B/P remains low and was sent to have a nursing assessment. Patient taken to room 21 per wheelchair with myself and family at his side. Verbal report gave to Andres QUEZADA .

## 2019-11-01 PROBLEM — I47.1 SUPRAVENTRICULAR TACHYCARDIA (HCC): Status: ACTIVE | Noted: 2019-01-01

## 2019-11-01 PROBLEM — D69.6 THROMBOCYTOPENIA (HCC): Status: ACTIVE | Noted: 2019-01-01

## 2019-11-01 PROBLEM — C34.90 LUNG CANCER (HCC): Status: ACTIVE | Noted: 2019-01-01

## 2019-11-01 PROBLEM — J44.9 COPD (CHRONIC OBSTRUCTIVE PULMONARY DISEASE) (HCC): Status: ACTIVE | Noted: 2019-01-01

## 2019-11-01 NOTE — H&P
[facility] Medicine Services            Primary Care Provider:  Ubaldo Mcconnell MD    Patient Care Team:  Ubaldo Mcconnell MD as PCP - General (Family Medicine)    CHIEF COMPLAINT:     No chief complaint on file.      Generalized weakness with shortness of breath    HISTORY OF PRESENT ILLNESS:    Patient is a 73-year-old gentleman with a history of stage IV small cell lung cancer who just completed 6 weeks of chemotherapy and radiation therapy.  His 2 daughters at the bedside have contributed to the history and they report that he is declining significantly over the last 3 weeks with significant weight loss generalized fatigue and malaise.  The patient reports that he has no strength and he cannot take deep breaths.  He has history of cigarette abuse and advanced COPD and is O2 dependent.  At the cancer center today he was to receive Neupogen but was found to be tachycardic and hypotensive with chills so he was sent over to [place] for direct admission.  Upon arrival his heart rate was 190s and EKG showed wide-complex tachycardia.  I was called emergently to the bedside where the patient is awake and alert and mildly tachypneic on nasal cannula O2.  I discussed his CODE STATUS with him and his 2 daughters and he requests chemical code but no CPR or intubation.  The patient was transferred to the [facility] and cardiology and intensive care he has been consulted.    Past Medical History:   Diagnosis Date   • Atrial fibrillation (CMS/HCC)    • COPD (chronic obstructive pulmonary disease) (CMS/HCC)     On continuous oxygen   • History of posttraumatic stress disorder (PTSD)    • Lung cancer (CMS/HCC)    • Pneumonia     History of pneumonia       Past Surgical History:   Procedure Laterality Date   • APPENDECTOMY     • LUNG BIOPSY Right 08/20/2019   • SHOULDER SURGERY Left     x 2       Family History   Problem Relation Age of Onset   • Hypertension Mother    • No Known Problems Father    • No Known  Problems Sister    • No Known Problems Brother    • No Known Problems Maternal Grandmother    • No Known Problems Maternal Grandfather    • No Known Problems Paternal Grandmother    • No Known Problems Paternal Grandfather        Social History     Tobacco Use   • Smoking status: Former Smoker     Packs/day: 2.00     Years: 53.00     Pack years: 106.00     Types: Cigarettes   • Smokeless tobacco: Current User     Types: Chew   Substance Use Topics   • Alcohol use: No     Frequency: Never   • Drug use: No       Medications Prior to Admission   Medication Sig Dispense Refill Last Dose   • aspirin 81 MG EC tablet Take 81 mg by mouth Daily.   11/1/2019 at Unknown time   • atorvastatin (LIPITOR) 10 MG tablet Take 10 mg by mouth Daily.   10/31/2019 at Unknown time   • budesonide-formoterol (SYMBICORT) 160-4.5 MCG/ACT inhaler Inhale 2 puffs 2 (Two) Times a Day.   11/1/2019 at Unknown time   • docusate sodium (COLACE) 100 MG capsule Take 1 capsule by mouth 2 (Two) Times a Day As Needed for Constipation. 60 capsule 3 Past Week at Unknown time   • HYDROcodone-acetaminophen (NORCO) 5-325 MG per tablet Take 1 tablet by mouth Every 6 (Six) Hours As Needed for Moderate Pain . 60 tablet 0 Past Month at Unknown time   • ipratropium (ATROVENT) 0.02 % nebulizer solution Take 500 mcg by nebulization 4 (Four) Times a Day.   11/1/2019 at Unknown time   • Magnesium Oxide 400 (240 Mg) MG tablet Take 1 tablet by mouth 2 (Two) Times a Day. 60 tablet 5 11/1/2019 at Unknown time   • metoprolol tartrate (LOPRESSOR) 25 MG tablet Take 25 mg by mouth Daily.  1 11/1/2019   • Multiple Vitamins-Minerals (EYE VITAMINS PO) Take 1 capsule by mouth.   11/1/2019 at Unknown time   • promethazine (PHENERGAN) 12.5 MG tablet Take 1 tablet by mouth Every 6 (Six) Hours As Needed for Nausea or Vomiting. 30 tablet 2 Past Week at Unknown time   • acetaminophen (TYLENOL) 500 MG tablet Take 500 mg by mouth Every 6 (Six) Hours As Needed for Mild Pain .   Taking   •  "albuterol (PROVENTIL) (5 MG/ML) 0.5% nebulizer solution Take 2.5 mg by nebulization Every 6 (Six) Hours As Needed for Wheezing.   Taking       Allergies:  Patient has no known allergies.      There is no immunization history on file for this patient.        REVIEW OF SYSTEMS:     12 point review of systems was unable to be reviewed because the patient was emergently being transferred to the [facility].    Vital Signs  Temp:  [97.2 °F (36.2 °C)-97.7 °F (36.5 °C)] 97.7 °F (36.5 °C)  Heart Rate:  [106-111] 106  Resp:  [18-21] 21  BP: (86-96)/(51-57) 96/57    Flowsheet Rows      First Filed Value   Admission Height  165.1 cm (65\") Documented at 11/01/2019 1526   Admission Weight  46.5 kg (102 lb 8.2 oz) Documented at 11/01/2019 1526           Physical Exam:    Well-developed thin elderly gentleman sitting up in the bed awake and alert and mildly tachypneic on nasal cannula O2; sclera anicteric, mucous membranes moist; lungs with decreased air entry in few scattered crackles bilaterally; distant heart sounds; abdomen soft and nondistended and nontender; extremities with no edema.        Results Review:      I reviewed the patient's new clinical results.    Lab Results (most recent)     Procedure Component Value Units Date/Time    CBC & Differential [[id number]] Collected:  11/01/19 1626    Specimen:  Blood Updated:  11/01/19 1635    Narrative:       The following orders were created for panel order CBC & Differential.  Procedure                               Abnormality         Status                     ---------                               -----------         ------                     CBC Auto Differential[[id number]]                            In process                   Please view results for these tests on the individual orders.    CBC Auto Differential [[id number]] Collected:  11/01/19 1626    Specimen:  Blood Updated:  11/01/19 1635    Calcium, Ionized [[id number]] Collected:  11/01/19 1626    Specimen:  Blood Updated: "  11/01/19 1635    Basic Metabolic Panel [[id number]] Collected:  11/01/19 1626    Specimen:  Blood Updated:  11/01/19 1635    Troponin [[id number]] Collected:  11/01/19 1626    Specimen:  Blood Updated:  11/01/19 1635    Magnesium [[id number]] Collected:  11/01/19 1626    Specimen:  Blood Updated:  11/01/19 1635    Phosphorus [[id number]] Collected:  11/01/19 1626    Specimen:  Blood Updated:  11/01/19 1635    Lactic Acid, Plasma [[id number]] Collected:  11/01/19 1626    Specimen:  Blood Updated:  11/01/19 1634          Imaging Results (Most Recent)     None          ECG/EMG Results (most recent)     Procedure Component Value Units Date/Time    ECG 12 Lead [[id number]] Collected:  11/01/19 1604     Updated:  11/01/19 1605    Narrative:       HEART RATE= 191  bpm  RR Interval= 314  ms  ID Interval=   ms  P Horizontal Axis=   deg  P Front Axis=   deg  QRSD Interval= 121  ms  QT Interval= 275  ms  QRS Axis= 46  deg  T Wave Axis= 240  deg  - ABNORMAL ECG -  Wide-QRS tachycardia  IVCD, consider LBBB  Electronically Signed By:   Date and Time of Study: 2019-11-01 16:04:05                    XR Chest PA & Lateral  Narrative:    DATE OF EXAM:   10/14/2019 5:45 PM     PROCEDURE:   XR CHEST PA AND LATERAL-     INDICATIONS:   worsening cough; R91.8-Other nonspecific abnormal finding of lung field;  J43.1-Panlobular emphysema; C34.31-Malignant neoplasm of lower lobe,  right bronchus or lung     COMPARISON:  Chest x-ray 08/20/2019     TECHNIQUE:   PA and lateral view of the chest is obtained.     FINDINGS:   There is hyperexpansion suggesting COPD with flattening of the  diaphragms area and there is probable bilateral upper lobe emphysema.  Heart size and pulmonary vascularity are normal. There is a Mediport  with the tip in the superior vena cava. There is a pleural-based density  posteriorly in the right lung base with an area of associated  atelectasis and consolidation which appears slightly improved from the  previous study and  could be chronic. The remainder of the lungs are  clear.      Impression: IMPRESSION :   Findings consistent with changes of COPD and probable chronic right  basilar changes as described above.     Electronically Signed By-Brock Wells On:10/14/2019 6:20 PM  This report was finalized on [id number] by  Brock Wells, .      Active Hospital Problems    Diagnosis  POA   • **Supraventricular tachycardia (CMS/HCC) [I47.1]  Yes     Priority: High   • Thrombocytopenia (CMS/HCC) [D69.6]  Yes     Priority: High   • Lung cancer (CMS/HCC) [C34.90]  Yes     Priority: High   • COPD (chronic obstructive pulmonary disease) (CMS/HCC) [J44.9]  Yes      Resolved Hospital Problems   No resolved problems to display.         Assessment/Plan       Supraventricular tachycardia (CMS/HCC)    Lung cancer (CMS/HCC)    Thrombocytopenia (CMS/HCC)    COPD (chronic obstructive pulmonary disease) (CMS/HCC)    SVT versus sustained ventricular tachycardia with tachypnea and generalized weakness  -Patient will be transferred to [facility]; I discussed with Dr. Russ and Karol SHER for the intensivist.    Stage IV small cell lung cancer status post chemo and radiation    Thrombocytopenia per verbal report from oncology secondary to the chemo and radiation  -Transfuse as needed (labs pending)    >30 minutes of critical care time were spent in managing this patient.        Disposition    Patient was transferred to the [facility]    I discussed the patients findings and my recommendations with patient and and his 2 daughters and all questions were answered.     Mariana Joseph MD  11/01/19  4:46 PM

## 2019-11-01 NOTE — CONSULTS
picc team consult:    Primary rn notified CXR reads with tip in SVC.  Ok to use line at this time.

## 2019-11-01 NOTE — CONSULTS
[facility]    Ubaldo Mcconnell MD    CHIEF COMPLAINT: Shortness of breath with rapid atrial fibrillation    HISTORY OF PRESENT ILLNESS:    This is a 73-year-old male patient of Dr. Katie Joseph who history ofstage IV small cell lung cancer who just completed 6 weeks of chemotherapy and radiation therapy.  He also has a history of paroxysmal atrial fibrillation.  Today in the office the patient presented to the cancer center to receive a Neupogen injection but was found to be in a rapid supraventricular tachycardia.  In the setting he was relatively hypotensive tachypneic and required O2 nasal cannula for supplemental O2.    Patient was taken to [facility] for treatment.  Dr. Joseph discussed his CODE STATUS with him as did I and as of now he does not want cardiopulmonary resuscitation or intubation but would allow for a chemical code and in front of 2 nurses agreed to emergent DC cardioversion as long as intubation was not involved.  Upon arrival to the intensive care unit he was in rapid atrial fibrillation with a ventricular response from 160 to 190 bpm; I personally reviewed his ECG tracing from today which showed the same with what appears to be anterior Q waves in widened QRS.  I did a quick carotid massage that had little if any effect.  He was relatively hypotensive with a blood pressure of 82/58 and a mean pressure of 58 mmHg.  Therefore initiated normal saline bolus of 250 cc and pushed to 5 mg of IV metoprolol which slowed his heart rate to 160s.  His O2 saturations remained above 92%.  We are having difficulty maintaining his blood pressure above a systolic of 75 to 80 mmHg.  Therefore I initiated levophe at a maximum dose of 0.3.  We are able to maintain his blood pressure greater than 110/73 mmHg with a mean greater than 80 mmHg.  I initiated IV amiodarone bolus at 150 mg to continue at 1 mg for the next 18 hours.  In addition to the above I did inject 0.15  mg of IV digoxin which had a mild effect bringing his heart rate into the 140s.  Currently stabilized hemodynamically with a heart rate in the 120s.    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  In addition his IVC was compressible to greater than 50% with a significant test indicating normovolemia.  I stopped the IV fluids at this point.  In addition he was found to have a hemoglobin of approximately 6 with a calcium of 7.7 and an ionized calcium 1.07 with normal renal function albeit underestimated due to his habitus (creatinine of 0.42 with a EGFR of greater than 150).  His troponin as of now is negative x1.  He is severely hyponatremic with a sodium of 118 with a mildly elevated potassium at 4.5.  The above is being repleted and he is being given blood products.    Past Medical History:   Diagnosis Date   • Atrial fibrillation (CMS/HCC)    • COPD (chronic obstructive pulmonary disease) (CMS/HCC)     On continuous oxygen   • History of posttraumatic stress disorder (PTSD)    • Lung cancer (CMS/HCC)    • Pneumonia     History of pneumonia     Past Surgical History:   Procedure Laterality Date   • APPENDECTOMY     • LUNG BIOPSY Right 08/20/2019   • SHOULDER SURGERY Left     x 2     Family History   Problem Relation Age of Onset   • Hypertension Mother    • No Known Problems Father    • No Known Problems Sister    • No Known Problems Brother    • No Known Problems Maternal Grandmother    • No Known Problems Maternal Grandfather    • No Known Problems Paternal Grandmother    • No Known Problems Paternal Grandfather      Social History     Tobacco Use   • Smoking status: Former Smoker     Packs/day: 2.00     Years: 53.00     Pack years: 106.00     Types: Cigarettes   • Smokeless tobacco: Current User     Types: Chew    Substance Use Topics   • Alcohol use: No     Frequency: Never   • Drug use: No     Medications Prior to Admission   Medication Sig Dispense Refill Last Dose   • aspirin 81 MG EC tablet Take 81 mg by mouth Daily.   11/1/2019 at Unknown time   • atorvastatin (LIPITOR) 10 MG tablet Take 10 mg by mouth Daily.   10/31/2019 at Unknown time   • budesonide-formoterol (SYMBICORT) 160-4.5 MCG/ACT inhaler Inhale 2 puffs 2 (Two) Times a Day.   11/1/2019 at Unknown time   • docusate sodium (COLACE) 100 MG capsule Take 1 capsule by mouth 2 (Two) Times a Day As Needed for Constipation. 60 capsule 3 Past Week at Unknown time   • HYDROcodone-acetaminophen (NORCO) 5-325 MG per tablet Take 1 tablet by mouth Every 6 (Six) Hours As Needed for Moderate Pain . 60 tablet 0 Past Month at Unknown time   • ipratropium (ATROVENT) 0.02 % nebulizer solution Take 500 mcg by nebulization 4 (Four) Times a Day.   11/1/2019 at Unknown time   • Magnesium Oxide 400 (240 Mg) MG tablet Take 1 tablet by mouth 2 (Two) Times a Day. 60 tablet 5 11/1/2019 at Unknown time   • metoprolol tartrate (LOPRESSOR) 25 MG tablet Take 25 mg by mouth Daily.  1 11/1/2019   • Multiple Vitamins-Minerals (EYE VITAMINS PO) Take 1 capsule by mouth.   11/1/2019 at Unknown time   • promethazine (PHENERGAN) 12.5 MG tablet Take 1 tablet by mouth Every 6 (Six) Hours As Needed for Nausea or Vomiting. 30 tablet 2 Past Week at Unknown time   • acetaminophen (TYLENOL) 500 MG tablet Take 500 mg by mouth Every 6 (Six) Hours As Needed for Mild Pain .   Taking   • albuterol (PROVENTIL) (5 MG/ML) 0.5% nebulizer solution Take 2.5 mg by nebulization Every 6 (Six) Hours As Needed for Wheezing.   Taking     Allergies:  Patient has no known allergies.    Review of Symptoms:  Constitutional: Patient afebrile no chills or unexpected weight changes  Respiratory: No cough, no wheezing with dyspnea  Cardiovascular: No chest pain, with palpitations, and dyspnea, and mild orthopnea and mild  "edema  Gastrointestinal: Mild nausea, vomiting, constipation or diarrhea.  No melena or dark stools    All other systems reviewed and are negative           Vital Signs  Temp:  [97.2 °F (36.2 °C)-97.7 °F (36.5 °C)] 97.7 °F (36.5 °C)  Heart Rate:  [106-154] 136  Resp:  [18-21] 21  BP: ()/(46-62) 102/62  Oxygen Therapy  SpO2: 96 %  Device (Oxygen Therapy): nasal cannula  Flow (L/min): 4}  Body mass index is 17.06 kg/m².  Flowsheet Rows      First Filed Value   Admission Height  165.1 cm (65\") Documented at 11/01/2019 1526   Admission Weight  46.5 kg (102 lb 8.2 oz) Documented at 11/01/2019 1526           Physical exam  Constitutional: Malnourished/cachectic appearing older than stated age in mild respiratory distress (tachypneic)  PERRL: Conjunctiva clear, with pallor, anicteric  HENMT: normocephalic, normal dentition, no cyanosis or pallor  Neck:no bruits, or thrills and bilateral normal carotid upstroke. Normal jugular venous pressure  Cardiovascular: Parasternal heave is present with inferolaterally displaced focal PMI.  Tachycardic with irregularly irregular rhythm: no rub, S3 gallop, no murmur or click and normal S1 and S2; no lower or upper but mild pitting bilateral lower extremity edema to the pre-tibia.   Lungs: unlabored, no wheezing with no rales or rhonchi on auscultation.  Extremities: Warm, no clubbing, cyanosis. Full and equal peripheral pulses in extremities with no bruits appreciated.   Abdomen: soft, non-tender, non-distended  Musculoskeletal: no joint tenderness or swelling and no erythema  Skin: Warm but skin at the knees is cool and dry, non-erythematous   Neuro:alert and normal affect. Oriented to time, place and person.     Results Review:    I reviewed the patient's new clinical results.  Lab Results (most recent)     Procedure Component Value Units Date/Time    Basic Metabolic Panel [[id number]]  (Abnormal) Collected:  11/01/19 1626    Specimen:  Blood Updated:  11/01/19 1711     Glucose " 162 mg/dL      BUN 21 mg/dL      Creatinine 0.42 mg/dL      Sodium 118 mmol/L      Potassium 4.5 mmol/L      Chloride 80 mmol/L      CO2 31.0 mmol/L      Calcium 7.7 mg/dL      eGFR Non African Amer >150 mL/min/1.73      BUN/Creatinine Ratio 50.0     Anion Gap 7.0 mmol/L     Narrative:       GFR Normal >60  Chronic Kidney Disease <60  Kidney Failure <15    Troponin [[id number]]  (Normal) Collected:  11/01/19 1626    Specimen:  Blood Updated:  11/01/19 1705     Troponin T <0.010 ng/mL     Narrative:       Troponin T Reference Range:  <= 0.03 ng/mL-   Negative for AMI  >0.03 ng/mL-     Abnormal for myocardial necrosis.  Clinicians would have to utilize clinical acumen, EKG, Troponin and serial changes to determine if it is an Acute Myocardial Infarction or myocardial injury due to an underlying chronic condition.     Magnesium [[id number]]  (Abnormal) Collected:  11/01/19 1626    Specimen:  Blood Updated:  11/01/19 1705     Magnesium 1.4 mg/dL     Phosphorus [[id number]]  (Normal) Collected:  11/01/19 1626    Specimen:  Blood Updated:  11/01/19 1705     Phosphorus 2.7 mg/dL     CBC & Differential [[id number]] Collected:  11/01/19 1626    Specimen:  Blood Updated:  11/01/19 1702    Narrative:       The following orders were created for panel order CBC & Differential.  Procedure                               Abnormality         Status                     ---------                               -----------         ------                     CBC Auto Differential[[id number]]        Abnormal            Final result                 Please view results for these tests on the individual orders.    CBC Auto Differential [[id number]]  (Abnormal) Collected:  11/01/19 1626    Specimen:  Blood Updated:  11/01/19 1702     WBC <0.20 10*3/mm3      Comment: Differential not indicated due to low WBC.        RBC 2.92 10*6/mm3      Hemoglobin 8.6 g/dL      Hematocrit 24.2 %      MCV 82.8 fL      MCH 29.5 pg      MCHC 35.7 g/dL      RDW 13.4 %       RDW-SD 38.1 fl      MPV 8.3 fL      Platelets 45 10*3/mm3     Lactic Acid, Plasma [[id number]]  (Normal) Collected:  11/01/19 1626    Specimen:  Blood Updated:  11/01/19 1655     Lactate 1.6 mmol/L     Calcium, Ionized [[id number]]  (Abnormal) Collected:  11/01/19 1626    Specimen:  Blood Updated:  11/01/19 1651     Ionized Calcium 1.07 mmol/L     POCT Electrolytes +HGB +HCT [[id number]]  (Abnormal) Collected:  11/01/19 1637    Specimen:  Blood Updated:  11/01/19 1644     Sodium 114 mmol/L      Potassium 4.2 mmol/L      Ionized Calcium 0.86 mmol/L      Comment: Serial Number: [id number]:  [id number]        Glucose 177 mg/dL      Hematocrit 26 %      Hemoglobin 8.9 g/dL     Blood Gas, Arterial [[id number]]  (Abnormal) Collected:  11/01/19 1637    Specimen:  Arterial Blood Updated:  11/01/19 1643     Site Right Radial     Rodriguez's Test Positive     pH, Arterial 7.532 pH units      pCO2, Arterial 35.2 mm Hg      pO2, Arterial 82.6 mm Hg      HCO3, Arterial 29.6 mmol/L      Base Excess, Arterial 6.5 mmol/L      Comment: Serial Number: [id number]:  [id number]        O2 Saturation, Arterial 97.3 %      CO2 Content 30.7 mmol/L      Barometric Pressure for Blood Gas --     Comment: N/A        Modality Cannula     FIO2 <21 %      Hemodilution No    POC Lactate [[id number]]  (Normal) Collected:  11/01/19 1637    Specimen:  Blood Updated:  11/01/19 1643     Lactate 1.4 mmol/L      Comment: Serial Number: [id number]:  [id number]             Imaging Results (Most Recent)     None        reviewed    ECG/EMG Results (most recent)     Procedure Component Value Units Date/Time    ECG 12 Lead [[id number]] Collected:  11/01/19 1604     Updated:  11/01/19 1605    Narrative:       HEART RATE= 191  bpm  RR Interval= 314  ms  GA Interval=   ms  P Horizontal Axis=   deg  P Front Axis=   deg  QRSD Interval= 121  ms  QT Interval= 275  ms  QRS Axis= 46  deg  T Wave Axis= 240  deg  - ABNORMAL ECG -  Wide-QRS tachycardia  IVCD, consider  LBBB  Electronically Signed By:   Date and Time of Study: 2019-11-01 16:04:05    Adult Transthoracic Echo Complete W/ Cont if Necessary Per Protocol [[id number]] Collected:  11/01/19 1722     Updated:  11/01/19 1753     BSA 1.5 m^2       CV ECHO PJ - RVDD 2.2 cm      IVSd 0.49 cm      LVIDd 5.3 cm      LVIDs 4.8 cm      LVPWd 0.7 cm      IVS/LVPW 0.7     FS 8.5 %      EDV(Teich) 132.5 ml      ESV(Teich) 107.8 ml      EF(Teich) 18.6 %      EDV(cubed) 144.8 ml      ESV(cubed) 110.9 ml      EF(cubed) 23.4 %      LV mass(C)d 101.9 grams      LV mass(C)dI 68.6 grams/m^2      SV(Teich) 24.7 ml      SI(Teich) 16.6 ml/m^2      SV(cubed) 33.9 ml      SI(cubed) 22.8 ml/m^2      Ao root diam 2.9 cm      Ao root area 6.4 cm^2      ACS 1.7 cm      asc Aorta Diam 2.7 cm      LVOT diam 1.8 cm      LVOT area 2.6 cm^2      EDV(MOD-sp4) 112.7 ml      ESV(MOD-sp4) 77.7 ml      EF(MOD-sp4) 31.0 %      SV(MOD-sp4) 35.0 ml      SI(MOD-sp4) 23.6 ml/m^2      Ao root area (BSA corrected) 1.9     LV Solis Vol (BSA corrected) 75.9 ml/m^2      LV Sys Vol (BSA corrected) 52.3 ml/m^2      Aortic R-R 0.57 sec      Aortic .8 BPM      MV V2 max 116.2 cm/sec      MV max PG 5.4 mmHg      MV V2 mean 67.5 cm/sec      MV mean PG 2.1 mmHg      MV V2 VTI 13.9 cm      MVA(VTI) 2.8 cm^2      Ao pk selin 134.3 cm/sec      Ao max PG 7.2 mmHg      Ao max PG (full) 1.6 mmHg      Ao V2 mean 91.0 cm/sec      Ao mean PG 3.9 mmHg      Ao mean PG (full) 1.6 mmHg      Ao V2 VTI 19.0 cm      CALE(I,A) 2.0 cm^2      CALE(I,D) 2.0 cm^2      CALE(V,A) 2.3 cm^2      CALE(V,D) 2.3 cm^2      LV V1 max PG 5.7 mmHg      LV V1 mean PG 2.3 mmHg      LV V1 max 119.0 cm/sec      LV V1 mean 67.5 cm/sec      LV V1 VTI 14.9 cm      MR max selin 415.1 cm/sec      MR max PG 68.9 mmHg      CO(Ao) 12.9 l/min      CI(Ao) 8.7 l/min/m^2      SV(Ao) 122.1 ml      SI(Ao) 82.2 ml/m^2      CO(LVOT) 4.1 l/min      CI(LVOT) 2.8 l/min/m^2      SV(LVOT) 39.0 ml      SI(LVOT) 26.2 ml/m^2      PA V2  max 133.9 cm/sec      PA max PG 7.2 mmHg      PA max PG (full) 4.4 mmHg      PA V2 mean 88.0 cm/sec      PA mean PG 3.5 mmHg      PA mean PG (full) 2.4 mmHg      PA V2 VTI 20.1 cm      RV V1 max PG 2.8 mmHg      RV V1 mean PG 1.1 mmHg      RV V1 max 83.3 cm/sec      RV V1 mean 48.2 cm/sec      RV V1 VTI 12.3 cm      TR max selin 300.8 cm/sec      RVSP(TR) 39.2 mmHg      RAP systole 3.0 mmHg       CV ECHO PJ - BZI_BMI 17.0 kilograms/m^2       CV ECHO PJ - BSA(HAYCOCK) 1.4 m^2       CV ECHO PJ - BZI_METRIC_WEIGHT 46.3 kg       CV ECHO PJ - BZI_METRIC_HEIGHT 165.1 cm      EF(MOD-bp) 20.0 %      LA dimension(2D) 3.4 cm         reviewed    Assessment/Plan     Atrial fibrillation with rapid ventricular response:  -Continue amiodarone drip without other AV nicholas agents as his blood pressure is labile and calcium channel blockers and beta-blockers will likely contribute to relative hypotension in the setting of the above.    Decreased LV systolic function (congestive heart failure):  Likely tachycardia induced LV systolic dysfunction at least in part in the setting of severe anemia and end-stage metastatic lung disease with cardiogenic low output state in the setting of severe LV systolic dysfunction.  Continue Levophed at this time.  We will look to maintain mean arterial pressure greater than 75 to 80 mmHg.    Hyportension  See above    Severe anemia  He has been typed and crossed and will receive 2 units of packed red blood cells.      I discussed the patients findings and my recommendations with patient.    Greater than 110 minutes total of face-to-face/floor critical care time with hemodynamic stabilization and initiation of the above was spent with the patient and family and nursing coordinating plan and patient management.  Of which counseling of patient with regard specifically to his current cardiogenic hemodynamically labile state comprised 50% of this total time.         Fernando Russ,  MD  11/01/19  6:53 PM

## 2019-11-01 NOTE — CONSULTS
picc team consult:    picc line placed LUE basilic vessel utilizing US guidance and modified seldinger technique with easily compressible vessel without difficulty

## 2019-11-01 NOTE — CONSULTS
Pulmonary/ Critical Care/ sleep medicine Consult Note        Patient Name:  Mendoza Robert    :  1945    Medical Record:  [id number]    Requesting Physician    Mariana Joseph MD    Primary Care Physician     Ubaldo Mcconnell MD    Reason for consultation    Mendoza Robert is a 73 y.o. male who was referred for consultation for critical care management    [place]  The patient is a 73-year-old male with a history of stage IV small cell lung cancer who just completed 6 weeks of chemotherapy and radiation.  Reportedly the patient has had significant weight loss, generalized fatigue, and malaise for the past 3 weeks.  The patient reported that he has had no strength and has difficulty with deep inspiration.  He denies recent fever, chills, nausea, vomiting, chest pain.  He reports chronic shortness of air which is mildly worsened.  Today the patient presented to the outpatient cancer center to receive Neupogen however was found to be tachycardic and hypotensive with concomitant chills and was subsequently a direct admission to [facility].  Upon admission he was found to have a heart rate in the 190s and EKG showed a wide-complex tachycardia.  Dr. Joseph discussed the patient's CODE STATUS with the patient and his family at the bedside.  The patient reiterated that he was a DNR with no defibrillation, no intubation, no CPR, however he does want to be full treatment up to that point including medication administration.  The patient was transferred to the CVICU for further evaluation and treatment.  On arrival to CVICU he was immediately evaluated by Dr. Russ, cardiology.  The patient again stated that he wished to be a DNR and verbalizes understanding of that situation.  Cardioversion was described to him in relation to his persistent tachycardia and he was agreeable to cardioversion.  The tachycardia was refractory to carotid massage, 5 mg of IV metoprolol, IV digoxin 0.125.  The patient remained  relatively hypotensive with MAP as low as 58 mmHg and a Levophed drip was initiated.  Saturations remained above 90% on room air and the patient remained awake, alert, oriented x3 and calm.  Amiodarone bolus and drip were initiated.  An echocardiogram was ordered stat which was read by Dr. Russ at the bedside.  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  Point-of-care labs revealed a sodium of 114, ionized calcium 0.85, hemoglobin 6.7.  Calcium replacement and 2 units PRBC were ordered however lab but resulted a hemoglobin of 8.9 and blood was not issued.    Review of Systems    as above    Medical History    Past Medical History:   Diagnosis Date   • Atrial fibrillation (CMS/Formerly [facility])    • COPD (chronic obstructive pulmonary disease) (CMS/Formerly [facility])     On continuous oxygen   • History of posttraumatic stress disorder (PTSD)    • Lung cancer (CMS/Formerly [facility])    • Pneumonia     History of pneumonia       Surgical History    Past Surgical History:   Procedure Laterality Date   • APPENDECTOMY     • LUNG BIOPSY Right 08/20/2019   • SHOULDER SURGERY Left     x 2        Family History    Family History   Problem Relation Age of Onset   • Hypertension Mother    • No Known Problems Father    • No Known Problems Sister    • No Known Problems Brother    • No Known Problems Maternal Grandmother    • No Known Problems Maternal Grandfather    • No Known Problems Paternal Grandmother    • No Known Problems Paternal Grandfather        Social History    Social History     Tobacco Use   • Smoking status: Former Smoker     Packs/day: 2.00     Years: 53.00     Pack years: 106.00     Types: Cigarettes   • Smokeless tobacco: Current User     Types: Chew   Substance Use Topics   • Alcohol use: No     Frequency: Never       Allergies    No Known Allergies    Medications    Scheduled Meds:  [START ON 11/2/2019] aspirin 81  mg Oral Daily   atorvastatin 10 mg Oral Daily   budesonide-formoterol 2 puff Inhalation BID - RT   calcium gluconate 2 g Intravenous Once   ipratropium 500 mcg Nebulization 4x Daily - RT   magnesium oxide 400 mg Oral BID   [START ON 2019] metoprolol tartrate 25 mg Oral Daily   [START ON 2019] multivitamin-iron-minerals-folic acid 1 tablet Oral Daily   sodium chloride 10 mL Intravenous Q12H     Continuous Infusions:  amiodarone 1 mg/min Last Rate: 1 mg/min (19 1710)   [START ON 2019] amiodarone 0.5 mg/min    norepinephrine 0.02-0.3 mcg/kg/min Last Rate: 0.4 mcg/kg/min (19 185)     PRN Meds:.•  acetaminophen  •  albuterol  •  docusate sodium  •  HYDROcodone-acetaminophen  •  ondansetron **OR** ondansetron  •  promethazine  •  sodium chloride      Physical Exam    tMax 24 hrs:  Temp (24hrs), Av.6 °F (36.4 °C), Min:97.2 °F (36.2 °C), Max:97.9 °F (36.6 °C)    Vitals Ranges:  Temp:  [97.2 °F (36.2 °C)-97.9 °F (36.6 °C)] 97.9 °F (36.6 °C)  Heart Rate:  [106-154] 136  Resp:  [18-21] 21  BP: ()/(46-62) 102/62  Intake and Output Last 3 Shifts:  No intake/output data recorded.    Constitutional: Cachectic and chronically ill-appearing  Eyes:  PERRL, conjunctiva normal   HENT:  Atraumatic, external ears normal, nose normal, oropharynx dry  Neck- normal range of motion, no tenderness, supple.  No carotid artery bruits  Respiratory:  No respiratory distress, normal breath sounds, fine crackles right base, no wheezing   Cardiovascular: Atrial fibrillation with RVR.  Irregularly irregular.  No murmur, rub, or gallop  GI:  Soft, nondistended, normal bowel sounds, nontender, scaphoid, no rebound, no guarding   : Deferred  Musculoskeletal:  No edema, no tenderness, no deformities.  Integument: Poor turgor, no rash   Neurologic:  Alert & oriented x 3, no lateralizing deficits  Psychiatric: Flat affect, cooperative    labs    Lab Results (last 24 hours)     Procedure Component Value Units  Date/Time    Basic Metabolic Panel [[id number]]  (Abnormal) Collected:  11/01/19 1626    Specimen:  Blood Updated:  11/01/19 1711     Glucose 162 mg/dL      BUN 21 mg/dL      Creatinine 0.42 mg/dL      Sodium 118 mmol/L      Potassium 4.5 mmol/L      Chloride 80 mmol/L      CO2 31.0 mmol/L      Calcium 7.7 mg/dL      eGFR Non African Amer >150 mL/min/1.73      BUN/Creatinine Ratio 50.0     Anion Gap 7.0 mmol/L     Narrative:       GFR Normal >60  Chronic Kidney Disease <60  Kidney Failure <15    Troponin [[id number]]  (Normal) Collected:  11/01/19 1626    Specimen:  Blood Updated:  11/01/19 1705     Troponin T <0.010 ng/mL     Narrative:       Troponin T Reference Range:  <= 0.03 ng/mL-   Negative for AMI  >0.03 ng/mL-     Abnormal for myocardial necrosis.  Clinicians would have to utilize clinical acumen, EKG, Troponin and serial changes to determine if it is an Acute Myocardial Infarction or myocardial injury due to an underlying chronic condition.     Magnesium [[id number]]  (Abnormal) Collected:  11/01/19 1626    Specimen:  Blood Updated:  11/01/19 1705     Magnesium 1.4 mg/dL     Phosphorus [[id number]]  (Normal) Collected:  11/01/19 1626    Specimen:  Blood Updated:  11/01/19 1705     Phosphorus 2.7 mg/dL     CBC & Differential [[id number]] Collected:  11/01/19 1626    Specimen:  Blood Updated:  11/01/19 1702    Narrative:       The following orders were created for panel order CBC & Differential.  Procedure                               Abnormality         Status                     ---------                               -----------         ------                     CBC Auto Differential[[id number]]        Abnormal            Final result                 Please view results for these tests on the individual orders.    CBC Auto Differential [[id number]]  (Abnormal) Collected:  11/01/19 1626    Specimen:  Blood Updated:  11/01/19 1702     WBC <0.20 10*3/mm3      Comment: Differential not indicated due to low WBC.         RBC 2.92 10*6/mm3      Hemoglobin 8.6 g/dL      Hematocrit 24.2 %      MCV 82.8 fL      MCH 29.5 pg      MCHC 35.7 g/dL      RDW 13.4 %      RDW-SD 38.1 fl      MPV 8.3 fL      Platelets 45 10*3/mm3     Lactic Acid, Plasma [[id number]]  (Normal) Collected:  11/01/19 1626    Specimen:  Blood Updated:  11/01/19 1655     Lactate 1.6 mmol/L     Calcium, Ionized [[id number]]  (Abnormal) Collected:  11/01/19 1626    Specimen:  Blood Updated:  11/01/19 1651     Ionized Calcium 1.07 mmol/L     POCT Electrolytes +HGB +HCT [[id number]]  (Abnormal) Collected:  11/01/19 1637    Specimen:  Blood Updated:  11/01/19 1644     Sodium 114 mmol/L      Potassium 4.2 mmol/L      Ionized Calcium 0.86 mmol/L      Comment: Serial Number: [id number]:  [id number]        Glucose 177 mg/dL      Hematocrit 26 %      Hemoglobin 8.9 g/dL     Blood Gas, Arterial [[id number]]  (Abnormal) Collected:  11/01/19 1637    Specimen:  Arterial Blood Updated:  11/01/19 1643     Site Right Radial     Rodriguez's Test Positive     pH, Arterial 7.532 pH units      pCO2, Arterial 35.2 mm Hg      pO2, Arterial 82.6 mm Hg      HCO3, Arterial 29.6 mmol/L      Base Excess, Arterial 6.5 mmol/L      Comment: Serial Number: [id number]:  [id number]        O2 Saturation, Arterial 97.3 %      CO2 Content 30.7 mmol/L      Barometric Pressure for Blood Gas --     Comment: N/A        Modality Cannula     FIO2 <21 %      Hemodilution No    POC Lactate [[id number]]  (Normal) Collected:  11/01/19 1637    Specimen:  Blood Updated:  11/01/19 1643     Lactate 1.4 mmol/L      Comment: Serial Number: [id number]:  [id number]             Imaging & Other Studies    Imaging Results (Last 72 Hours)     Procedure Component Value Units Date/Time    XR Chest 1 View [[id number]] Collected:  11/01/19 1925     Updated:  11/01/19 1928    Narrative:       DATE OF EXAM:  11/1/2019 6:40 PM     PROCEDURE:  XR CHEST 1 VW-     INDICATIONS:  :Post picc line     COMPARISON:  10/14/2019     TECHNIQUE:   Single  radiographic AP view of the chest was obtained.     FINDINGS:  There is a left-sided PICC line with tip in the superior vena cava.  There is a right-sided [id number] with tip in the superior vena cava.  There is right basilar airspace disease consistent with pneumonia. There  are small bilateral pleural fluid collections greater on the right than  the left.        Impression:       New left PICC line with the tip in the superior vena cava. Worsening  right basilar infiltrate consistent with pneumonia. Small bilateral  pleural fluid collections greater on the right than the left.     Electronically Signed By-Oliver Crystal On:11/1/2019 7:26 PM  This report was finalized on [id number] by  Oliver Crystal, .          Assessment      Supraventricular tachycardia (CMS/HCC)    Lung cancer (CMS/HCC)    Thrombocytopenia (CMS/HCC)    COPD (chronic obstructive pulmonary disease) (CMS/HCC)  A. fib with RVR  Hypocalcemia  Hyponatremia  Severe leukopenia  EF 20%  Stage IV small cell lung CA, status post chemo and radiation    Plan    Cardiology following closely  Patient received Lopressor, digoxin, amiodarone bolus and drip as detailed above  O2 as needed to maintain a saturation of 89 to 91%, currently on room air  Continue Atrovent nebs  Continue Symbicort    PUD prophylaxis with Protonix  DVT prophylaxis with SCDs.  No anticoagulation at this time due to thrombocytopenia    See orders. The plan was discussed with the patient    1  I thank you for this opportunity to take part in this patient's care and will follow the patient along with you.

## 2019-11-02 NOTE — PROGRESS NOTES
"KPA/PULM/CC PROGRESS NOTE       PATIENT IDENTIFICATION    Name: Mendoza Robert Age: 73 y.o. Sex: male :  1945 MRN: [id number]    SUBJECTIVE    On 5 L NC, hypotensive on 2 pressors, lv and vasopressin, still in afib with RVR with wide complex ? Abberency, cardizem and amio drip, HR still in 120-140s. WBC 0.2  OBJECTIVE    /61   Pulse (!) 122   Temp 97.2 °F (36.2 °C) (Axillary)   Resp 20   Ht 165.1 cm (65\")   Wt 46.5 kg (102 lb 8.2 oz) Comment: 19  SpO2 94%   BMI 17.06 kg/m²   Intake/Output last 3 shifts:  I/O last 3 completed shifts:  In: 2631.8 [P.O.:240; I.V.:1813; Blood:578.8]  Out: 1125 [Urine:1125]  Intake/Output this shift:  I/O this shift:  In: 240 [P.O.:240]  Out: 1100 [Urine:1100]    General Appearance:  Alert, cooperative, moderate distress, appears stated age  Head:  Normocephalic, without obvious abnormality, atraumatic  Eyes:  PERRL, conjunctiva/corneas clear, EOM's intact     Neck:  Supple,  no adenopathy;      Lungs:   Clear to auscultation bilaterally, respirations labored  Chest wall:  No tenderness  Heart:  Regular rate and rhythm, S1 and S2 normal, no murmur, rub   or gallop  Abdomen:  Soft, non-tender, bowel sounds active all four quadrants,  no masses, no hepatomegaly, no splenomegaly  Extremities:  Extremities normal, no cyanosis or edema  Pulses: 2+ and symmetric all extremities  Skin:  No rashes or lesions  Neurologic:   Alert and oriented, no focal deficits, appears anxious    Scheduled Meds:  [START ON 11/3/2019] !Vancomycin Level Draw Needed  Does not apply Once   [START ON 11/3/2019] !Vancomycin Level Draw Needed  Does not apply Once   aspirin 81 mg Oral Daily   atorvastatin 10 mg Oral Daily   budesonide-formoterol 2 puff Inhalation BID - RT   cefepime 2 g Intravenous Q12H   fentaNYL citrate (PF)      insulin lispro 0-14 Units Subcutaneous 4x Daily With Meals & Nightly   ipratropium 500 mcg Nebulization Q6H   magnesium oxide 400 mg Oral BID   metoprolol " tartrate 25 mg Oral Daily   midazolam      multivitamin-iron-minerals-folic acid 1 tablet Oral Daily   pantoprazole 40 mg Intravenous Q AM   sodium chloride 10 mL Intravenous Q12H   vancomycin 750 mg Intravenous Q12H       Continuous Infusions:  amiodarone 0.5 mg/min Last Rate: 0.5 mg/min (11/02/19 1256)   diltiaZEM 5 mg/hr Last Rate: 5 mg/hr (11/02/19 1004)   norepinephrine 0.02-0.3 mcg/kg/min Last Rate: Stopped (11/01/19 2230)   Pharmacy to dose vancomycin     phenylephrine 0.5-3 mcg/kg/min Last Rate: 2.5 mcg/kg/min (11/02/19 1142)   vasopressin 0.03 Units/min Last Rate: 0.03 Units/min (11/02/19 1412)       PRN Meds:acetaminophen  •  dextrose  •  dextrose  •  docusate sodium  •  glucagon (human recombinant)  •  HYDROcodone-acetaminophen  •  ipratropium  •  ondansetron **OR** ondansetron  •  Pharmacy to dose vancomycin  •  potassium chloride **OR** potassium chloride **OR** potassium chloride  •  promethazine  •  sodium chloride     Data Review:  Lab Results (last 24 hours)     Procedure Component Value Units Date/Time    Basic Metabolic Panel [[id number]]  (Abnormal) Collected:  11/02/19 1054    Specimen:  Blood Updated:  11/02/19 1143     Glucose 172 mg/dL      BUN 15 mg/dL      Creatinine 0.37 mg/dL      Sodium 122 mmol/L      Potassium 3.1 mmol/L      Chloride 84 mmol/L      CO2 29.0 mmol/L      Calcium 7.7 mg/dL      eGFR Non African Amer >150 mL/min/1.73      BUN/Creatinine Ratio 40.5     Anion Gap 9.0 mmol/L     Narrative:       GFR Normal >60  Chronic Kidney Disease <60  Kidney Failure <15    Magnesium [[id number]]  (Abnormal) Collected:  11/02/19 1054    Specimen:  Blood Updated:  11/02/19 1143     Magnesium 1.2 mg/dL     CBC & Differential [[id number]] Collected:  11/02/19 1054    Specimen:  Blood Updated:  11/02/19 1112    Narrative:       The following orders were created for panel order CBC & Differential.  Procedure                               Abnormality         Status                     ---------                                -----------         ------                     CBC Auto Differential[[id number]]        Abnormal            Final result                 Please view results for these tests on the individual orders.    CBC Auto Differential [[id number]]  (Abnormal) Collected:  11/02/19 1054    Specimen:  Blood Updated:  11/02/19 1112     WBC 0.20 10*3/mm3      Comment: Differential not indicated due to low WBC.         RBC 3.74 10*6/mm3      Hemoglobin 10.6 g/dL      Hematocrit 30.9 %      MCV 82.8 fL      MCH 28.4 pg      MCHC 34.3 g/dL      RDW 13.6 %      RDW-SD 38.9 fl      MPV 8.3 fL      Platelets 50 10*3/mm3     POC Glucose Once [[id number]]  (Abnormal) Collected:  11/02/19 0819    Specimen:  Blood Updated:  11/02/19 0820     Glucose 207 mg/dL      Comment: Serial Number: [id number]:  [id number]       Respiratory Culture - Sputum, Cough [[id number]] Collected:  11/01/19 2351    Specimen:  Sputum from Cough Updated:  11/02/19 0704     Gram Stain Few (2+) WBCs per low power field      Few (2+) Epithelial cells per low power field      Many (4+) Gram negative bacilli      Few (2+) Gram positive cocci    Basic Metabolic Panel [[id number]]  (Abnormal) Collected:  11/02/19 0250    Specimen:  Blood Updated:  11/02/19 0319     Glucose 285 mg/dL      BUN 17 mg/dL      Creatinine 0.36 mg/dL      Sodium 120 mmol/L      Potassium 3.2 mmol/L      Chloride 82 mmol/L      CO2 30.0 mmol/L      Calcium 7.7 mg/dL      eGFR Non African Amer >150 mL/min/1.73      BUN/Creatinine Ratio 47.2     Anion Gap 8.0 mmol/L     Narrative:       GFR Normal >60  Chronic Kidney Disease <60  Kidney Failure <15    Osmolality, Serum [[id number]]  (Abnormal) Collected:  11/02/19 0250    Specimen:  Blood Updated:  11/02/19 0319     Osmolality 259 mOsm/kg     Basic Metabolic Panel [[id number]]  (Abnormal) Collected:  11/02/19 0250    Specimen:  Blood Updated:  11/02/19 0319     Glucose 287 mg/dL      BUN 17 mg/dL      Creatinine 0.33 mg/dL       Sodium 120 mmol/L      Potassium 3.2 mmol/L      Chloride 82 mmol/L      CO2 30.0 mmol/L      Calcium 7.3 mg/dL      eGFR Non African Amer >150 mL/min/1.73      BUN/Creatinine Ratio 51.5     Anion Gap 8.0 mmol/L     Narrative:       GFR Normal >60  Chronic Kidney Disease <60  Kidney Failure <15    CBC & Differential [[id number]] Collected:  11/02/19 0250    Specimen:  Blood Updated:  11/02/19 0318    Narrative:       The following orders were created for panel order CBC & Differential.  Procedure                               Abnormality         Status                     ---------                               -----------         ------                     CBC Auto Differential[[id number]]        Abnormal            Final result                 Please view results for these tests on the individual orders.    CBC Auto Differential [[id number]]  (Abnormal) Collected:  11/02/19 0250    Specimen:  Blood Updated:  11/02/19 0318     WBC <0.20 10*3/mm3      RBC 3.26 10*6/mm3      Hemoglobin 9.4 g/dL      Hematocrit 26.8 %      MCV 82.2 fL      MCH 28.8 pg      MCHC 35.0 g/dL      RDW 13.5 %      RDW-SD 38.1 fl      MPV 9.0 fL      Platelets 55 10*3/mm3     Calcium, Ionized [[id number]]  (Abnormal) Collected:  11/02/19 0250    Specimen:  Blood Updated:  11/02/19 0306     Ionized Calcium 1.11 mmol/L     Respiratory Panel, PCR - Swab, Nasopharynx [[id number]]  (Abnormal) Collected:  11/01/19 2239    Specimen:  Swab from Nasopharynx Updated:  11/02/19 0223     ADENOVIRUS, PCR Not Detected     Coronavirus 229E Not Detected     Coronavirus HKU1 Not Detected     Coronavirus NL63 Not Detected     Coronavirus OC43 Not Detected     Human Metapneumovirus Not Detected     Human Rhinovirus/Enterovirus Detected     Influenza B PCR Not Detected     Parainfluenza Virus 1 Not Detected     Parainfluenza Virus 2 Not Detected     Parainfluenza Virus 3 Not Detected     Parainfluenza Virus 4 Not Detected     Bordetella pertussis pcr Not  Detected     Influenza A H1 2009 PCR Not Detected     Chlamydophila pneumoniae PCR Not Detected     Mycoplasma pneumo by PCR Not Detected     Influenza A PCR Not Detected     Influenza A H3 Not Detected     Influenza A H1 Not Detected     RSV, PCR Not Detected    TSH [[id number]]  (Normal) Collected:  11/01/19 2340    Specimen:  Blood Updated:  11/02/19 0151     TSH 0.386 uIU/mL     T4, Free [[id number]]  (Normal) Collected:  11/01/19 2340    Specimen:  Blood Updated:  11/02/19 0151     Free T4 1.27 ng/dL      Comment: Results may be falsely increased if patient taking Biotin.       Sodium, Urine, Random - Urine, Clean Catch [[id number]] Collected:  11/01/19 2238    Specimen:  Urine, Clean Catch Updated:  11/02/19 0150     Sodium, Urine <20 mmol/L     Narrative:       Reference intervals for random urine have not been established.  Clinical usage is dependent upon physician's interpretation in combination with other laboratory tests.     Osmolality, Urine - Urine, Clean Catch [[id number]]  (Normal) Collected:  11/01/19 2238    Specimen:  Urine, Clean Catch Updated:  11/02/19 0145     Osmolality, Urine 520 mOsm/kg     Legionella Antigen, Urine - Urine, Urine, Clean Catch [[id number]]  (Normal) Collected:  11/01/19 2238    Specimen:  Urine, Clean Catch Updated:  11/02/19 0124     LEGIONELLA ANTIGEN, URINE Negative    S. Pneumo Ag Urine or CSF - Urine, Urine, Clean Catch [[id number]]  (Normal) Collected:  11/01/19 2238    Specimen:  Urine, Clean Catch Updated:  11/02/19 0124     Strep Pneumo Ag Negative    Blood Culture - Blood, Hand, Right [[id number]] Collected:  11/02/19 0003    Specimen:  Blood from Hand, Right Updated:  11/02/19 0037    Creatinine, Serum [[id number]]  (Abnormal) Collected:  11/01/19 2340    Specimen:  Blood Updated:  11/02/19 0015     Creatinine 0.33 mg/dL      eGFR Non African Amer >150 mL/min/1.73     Narrative:       GFR Normal >60  Chronic Kidney Disease <60  Kidney Failure <15    Blood Culture -  Blood, Blood, Central Line [[id number]] Collected:  11/01/19 2341    Specimen:  Blood, Central Line Updated:  11/01/19 2356    MRSA Screen Culture - Swab, Nares [[id number]] Collected:  11/01/19 2240    Specimen:  Swab from Nares Updated:  11/01/19 2251    Basic Metabolic Panel [[id number]]  (Abnormal) Collected:  11/01/19 1626    Specimen:  Blood Updated:  11/01/19 1711     Glucose 162 mg/dL      BUN 21 mg/dL      Creatinine 0.42 mg/dL      Sodium 118 mmol/L      Potassium 4.5 mmol/L      Chloride 80 mmol/L      CO2 31.0 mmol/L      Calcium 7.7 mg/dL      eGFR Non African Amer >150 mL/min/1.73      BUN/Creatinine Ratio 50.0     Anion Gap 7.0 mmol/L     Narrative:       GFR Normal >60  Chronic Kidney Disease <60  Kidney Failure <15    Troponin [[id number]]  (Normal) Collected:  11/01/19 1626    Specimen:  Blood Updated:  11/01/19 1705     Troponin T <0.010 ng/mL     Narrative:       Troponin T Reference Range:  <= 0.03 ng/mL-   Negative for AMI  >0.03 ng/mL-     Abnormal for myocardial necrosis.  Clinicians would have to utilize clinical acumen, EKG, Troponin and serial changes to determine if it is an Acute Myocardial Infarction or myocardial injury due to an underlying chronic condition.     Magnesium [[id number]]  (Abnormal) Collected:  11/01/19 1626    Specimen:  Blood Updated:  11/01/19 1705     Magnesium 1.4 mg/dL     Phosphorus [[id number]]  (Normal) Collected:  11/01/19 1626    Specimen:  Blood Updated:  11/01/19 1705     Phosphorus 2.7 mg/dL     CBC & Differential [[id number]] Collected:  11/01/19 1626    Specimen:  Blood Updated:  11/01/19 1702    Narrative:       The following orders were created for panel order CBC & Differential.  Procedure                               Abnormality         Status                     ---------                               -----------         ------                     CBC Auto Differential[[id number]]        Abnormal            Final result                 Please view results  for these tests on the individual orders.    CBC Auto Differential [[id number]]  (Abnormal) Collected:  11/01/19 1626    Specimen:  Blood Updated:  11/01/19 1702     WBC <0.20 10*3/mm3      Comment: Differential not indicated due to low WBC.        RBC 2.92 10*6/mm3      Hemoglobin 8.6 g/dL      Hematocrit 24.2 %      MCV 82.8 fL      MCH 29.5 pg      MCHC 35.7 g/dL      RDW 13.4 %      RDW-SD 38.1 fl      MPV 8.3 fL      Platelets 45 10*3/mm3     Lactic Acid, Plasma [[id number]]  (Normal) Collected:  11/01/19 1626    Specimen:  Blood Updated:  11/01/19 1655     Lactate 1.6 mmol/L     Calcium, Ionized [[id number]]  (Abnormal) Collected:  11/01/19 1626    Specimen:  Blood Updated:  11/01/19 1651     Ionized Calcium 1.07 mmol/L     POCT Electrolytes +HGB +HCT [[id number]]  (Abnormal) Collected:  11/01/19 1637    Specimen:  Blood Updated:  11/01/19 1644     Sodium 114 mmol/L      Potassium 4.2 mmol/L      Ionized Calcium 0.86 mmol/L      Comment: Serial Number: [id number]:  [id number]        Glucose 177 mg/dL      Hematocrit 26 %      Hemoglobin 8.9 g/dL     Blood Gas, Arterial [[id number]]  (Abnormal) Collected:  11/01/19 1637    Specimen:  Arterial Blood Updated:  11/01/19 1643     Site Right Radial     Rodriguez's Test Positive     pH, Arterial 7.532 pH units      pCO2, Arterial 35.2 mm Hg      pO2, Arterial 82.6 mm Hg      HCO3, Arterial 29.6 mmol/L      Base Excess, Arterial 6.5 mmol/L      Comment: Serial Number: [id number]:  [id number]        O2 Saturation, Arterial 97.3 %      CO2 Content 30.7 mmol/L      Barometric Pressure for Blood Gas --     Comment: N/A        Modality Cannula     FIO2 <21 %      Hemodilution No    POC Lactate [[id number]]  (Normal) Collected:  11/01/19 1637    Specimen:  Blood Updated:  11/01/19 1643     Lactate 1.4 mmol/L      Comment: Serial Number: [id number]:  [id number]                Imaging:  Imaging Results (Last 72 Hours)     Procedure Component Value Units Date/Time    XR Chest 1 View  [[id number]] Collected:  11/01/19 1925     Updated:  11/01/19 1928    Narrative:       DATE OF EXAM:  11/1/2019 6:40 PM     PROCEDURE:  XR CHEST 1 VW-     INDICATIONS:  :Post picc line     COMPARISON:  10/14/2019     TECHNIQUE:   Single radiographic AP view of the chest was obtained.     FINDINGS:  There is a left-sided PICC line with tip in the superior vena cava.  There is a right-sided Snlefa-i-Wwpt with tip in the superior vena cava.  There is right basilar airspace disease consistent with pneumonia. There  are small bilateral pleural fluid collections greater on the right than  the left.        Impression:       New left PICC line with the tip in the superior vena cava. Worsening  right basilar infiltrate consistent with pneumonia. Small bilateral  pleural fluid collections greater on the right than the left.     Electronically Signed By-Oliver Crystal On:11/1/2019 7:26 PM  This report was finalized on [id number] by  Oliver Crystal, .            ASSESSMENT/PLAN:     Supraventricular tachycardia (CMS/HCC)    Lung cancer (CMS/HCC)    Thrombocytopenia (CMS/HCC)    COPD (chronic obstructive pulmonary disease) (CMS/HCC)    Supraventricular tachycardia (CMS/HCC)    Lung cancer (CMS/HCC)    Thrombocytopenia (CMS/HCC)    COPD (chronic obstructive pulmonary disease) (CMS/HCC)  A. fib with RVR  Cardiogenic shock   Hypocalcemia  Hyponatremia  Severe leukopenia  EF 20%  Stage IV small cell lung CA, status post chemo and radiation  RLL pneumonia  Neutropenic sepsis  Hypotension cardiogenic versus septic     PLAN    Cardiology following and plan for cardioversion noted due to uncotnrolled afib with hypotension  Continue broad abx with cefepime and vanc for penumonia, and neutopenic sepsis.Follow all cultures  Will need hematology to see him, will consult.   Will check lactic acid, but will not be able to tolerate any fluid as he is hypoxic and severe cardiomyopathy  Patient received Lopressor, digoxin, amiodarone bolus and drip  as detailed above  O2 as needed to maintain a saturation of 89 to 91%, currently on 5L NC  Continue Atrovent nebs  Continue Symbicort     PUD prophylaxis with Protonix  DVT prophylaxis with SCDs.  No anticoagulation at this time due to thrombocytopenia  He is critically ill with above. Prognosis is poor. He is DNR/DNI, but allowing for chemicals and one shock  Discussed with patient and family  Discussed with nursing  Discussed with Dr. Russ  CC time 34 minutes which did not include time for any procedures  Namita Tyler MD  11/2/2019  3:19 PM

## 2019-11-02 NOTE — PROGRESS NOTES
"Pharmacokinetic Consult - Vancomycin Dosing (Initial Note)    Mendoza Robert has been consulted for pharmacy to dose vancomycin for left lower pnuemonia.    Goal trough: 15-20 mg/L   Other antimicrobials: Cefepime 2 gm IV q8hr    Relevant clinical data and objective history reviewed:  73 y.o. male 165.1 cm (65\") 46.5 kg (102 lb 8.2 oz)    Past Medical History:   Diagnosis Date   • Atrial fibrillation (CMS/HCC)    • COPD (chronic obstructive pulmonary disease) (CMS/HCC)     On continuous oxygen   • History of posttraumatic stress disorder (PTSD)    • Lung cancer (CMS/HCC)    • Pneumonia     History of pneumonia     Creatinine   Date Value Ref Range Status   11/01/2019 0.42 (L) 0.76 - 1.27 mg/dL Final   10/21/2019 0.40 (L) 0.60 - 1.30 mg/dL Final     Comment:     Serial Number: [id number]:  [id number]   10/21/2019 0.33 (L) 0.76 - 1.27 mg/dL Final   10/14/2019 0.40 (L) 0.60 - 1.30 mg/dL Final     Comment:     Serial Number: [id number]:  [id number]   10/14/2019 0.50 (L) 0.70 - 1.20 mg/dL Final   10/07/2019 0.40 (L) 0.60 - 1.30 mg/dL Final     Comment:     Serial Number: [id number]:  [id number]     BUN   Date Value Ref Range Status   11/01/2019 21 8 - 23 mg/dL Final     Estimated Creatinine Clearance: 55.7 mL/min (A) (by C-G formula based on SCr of 0.42 mg/dL (L)).    Lab Results   Component Value Date    WBC <0.20 (C) 11/01/2019     Temp Readings from Last 3 Encounters:   11/01/19 97.7 °F (36.5 °C) (Oral)   11/01/19 97.2 °F (36.2 °C)   10/31/19 98.7 °F (37.1 °C) (Oral)          Assessment/Plan  Will start vancomycin 1,000 mg IV ( ~21 mg x 47 kg ABW ) x 1 then 750 mg IV (~16 mg x 47 kg ABW ) q12hr. Will monitor serum creatinine every 24 hours for the first 3 days then at least every 48 hours per dosing recommendations. Pharmacy will continue to follow daily while on vancomycin and adjust as needed.     Sabrina Armando RPh      "

## 2019-11-02 NOTE — PROGRESS NOTES
Cardiology    Patient Care Team:  Ubaldo Mcconnell MD as PCP - General (Family Medicine)        ADMITTING DIAGNOSES: Rapid atrial fibrillation    SUBJECTIVE: Mildly short of breath with cough and persistent nausea    Review of Symptoms:  Constitutional: Patient afebrile no chills or unexpected weight changes  Respiratory: +cough, + wheezing + dyspnea  Cardiovascular: No chest pain, palpitations, + dyspnea, +orthopnea and +edema  Gastrointestinal: No nausea, vomiting, constipation or diarrhea.  No melena or dark stools    All other systems reviewed and are negative     PAST MEDICAL HISTORY:   Past Medical History:   Diagnosis Date   • Atrial fibrillation (CMS/[facility])    • COPD (chronic obstructive pulmonary disease) (CMS/[facility])     On continuous oxygen   • History of posttraumatic stress disorder (PTSD)    • Lung cancer (CMS/[facility])    • Pneumonia     History of pneumonia       ALLERGIES:   No Known Allergies      PAST SURGICAL HISTORY:   Past Surgical History:   Procedure Laterality Date   • APPENDECTOMY     • LUNG BIOPSY Right 08/20/2019   • SHOULDER SURGERY Left     x 2          FAMILY HISTORY:   Family History   Problem Relation Age of Onset   • Hypertension Mother    • No Known Problems Father    • No Known Problems Sister    • No Known Problems Brother    • No Known Problems Maternal Grandmother    • No Known Problems Maternal Grandfather    • No Known Problems Paternal Grandmother    • No Known Problems Paternal Grandfather          SOCIAL HISTORY:   Social History     Socioeconomic History   • Marital status:      Spouse name: Not on file   • Number of children: Not on file   • Years of education: Not on file   • Highest education level: Not on file   Tobacco Use   • Smoking status: Former Smoker     Packs/day: 2.00     Years: 53.00     Pack years: 106.00     Types: Cigarettes   • Smokeless tobacco: Current User     Types: Chew   Substance and Sexual Activity   • Alcohol use: No     Frequency: Never   • Drug  use: No   • Sexual activity: Defer       CURRENT MEDICATIONS:     Current Facility-Administered Medications:   •  [START ON 11/3/2019] !Vancomycin Level Draw Needed, , Does not apply, Once, Day, GISELLA Roberson  •  [START ON 11/3/2019] !Vancomycin Level Draw Needed, , Does not apply, Once, Day, GISELLA Roberson  •  acetaminophen (TYLENOL) tablet 500 mg, 500 mg, Oral, Q6H PRN, Mariana Joseph MD  •  albuterol (PROVENTIL) nebulizer solution 0.5% 2.5 mg/0.5mL, 2.5 mg, Nebulization, Q6H PRN, Mariana Joseph MD  •  AMIODARONE HCL IN DEXTROSE 450-5 MG/250ML-% IV SOLN, 0.5 mg/min, Intravenous, Continuous, Fernando Russ MD, Last Rate: 16.67 mL/hr at 11/02/19 0007, 0.5 mg/min at 11/02/19 0007  •  aspirin EC tablet 81 mg, 81 mg, Oral, Daily, Mariana Joseph MD, 81 mg at 11/02/19 0846  •  atorvastatin (LIPITOR) tablet 10 mg, 10 mg, Oral, Daily, Mariana Joseph MD, 10 mg at 11/02/19 0846  •  budesonide-formoterol (SYMBICORT) 160-4.5 MCG/ACT inhaler 2 puff, 2 puff, Inhalation, BID - RT, Mariana Joseph MD, 2 puff at 11/02/19 0700  •  cefepime 2 gm IVPB in 100 ml NS (MBP), 2 g, Intravenous, Q12H, Mariana Joseph MD  •  dextrose (D50W) 25 g/ 50mL Intravenous Solution 25 g, 25 g, Intravenous, Q15 Min PRN, Fernando Bates APRN  •  dextrose (GLUTOSE) oral gel 15 g, 15 g, Oral, Q15 Min PRN, Fernando Bates APRN  •  diltiaZEM (CARDIZEM) 125mg/125 mL infusion, 5 mg/hr, Intravenous, Titrated, Fernando Russ MD, Last Rate: 5 mL/hr at 11/02/19 1004, 5 mg/hr at 11/02/19 1004  •  docusate sodium (COLACE) capsule 100 mg, 100 mg, Oral, BID PRN, Mariana Joseph MD  •  glucagon (human recombinant) (GLUCAGEN DIAGNOSTIC) injection 1 mg, 1 mg, Subcutaneous, Q15 Min PRN, Fernando Bates, GISELLA  •  HYDROcodone-acetaminophen (NORCO) 5-325 MG per tablet 1 tablet, 1 tablet, Oral, Q6H PRN, Mariana Joseph MD  •  insulin lispro (humaLOG) injection 0-14 Units, 0-14 Units, Subcutaneous, 4x Daily With Meals & Nightly, Day, Karol, APRN  •  ipratropium  (ATROVENT) nebulizer solution 0.5 mg, 500 mcg, Nebulization, 4x Daily - RT, Mariana Joseph MD, 0.5 mg at 11/01/19 2120  •  magnesium oxide (MAGOX) tablet 400 mg, 400 mg, Oral, BID, Mariana Joseph MD, 400 mg at 11/02/19 0847  •  metoprolol tartrate (LOPRESSOR) tablet 25 mg, 25 mg, Oral, Daily, Mariana Joseph MD  •  multivitamin-iron-minerals-folic acid (CENTRUM) chewable tablet 1 tablet, 1 tablet, Oral, Daily, Mariana Joseph MD, 1 tablet at 11/02/19 0846  •  norepinephrine (LEVOPHED) 8 mg/250 mL (32 mcg/mL) in sodium chloride 0.9% infusion (premix), 0.02-0.3 mcg/kg/min, Intravenous, Titrated, Fernando Russ MD, Stopped at 11/01/19 2230  •  ondansetron (ZOFRAN) tablet 4 mg, 4 mg, Oral, Q6H PRN **OR** ondansetron (ZOFRAN) injection 4 mg, 4 mg, Intravenous, Q6H PRN, Mariana Joseph MD  •  pantoprazole (PROTONIX) injection 40 mg, 40 mg, Intravenous, Q AM, Day, GISELLA Roberson, 40 mg at 11/02/19 0504  •  Pharmacy to dose vancomycin, , Does not apply, Continuous PRN, Fernando Bates APRN  •  phenylephrine (DAIJA-SYNEPHRINE) 50 mg/250 mL (0.2 mg/mL) in 0.9% NS  infusion, 0.5-3 mcg/kg/min, Intravenous, Titrated, Fernando Bates APRN, Last Rate: 41.9 mL/hr at 11/02/19 0529, 3 mcg/kg/min at 11/02/19 0529  •  potassium chloride (K-DUR,KLOR-CON) CR tablet 40 mEq, 40 mEq, Oral, PRN, 40 mEq at 11/02/19 0846 **OR** potassium chloride (KLOR-CON) packet 40 mEq, 40 mEq, Oral, PRN **OR** potassium chloride 10 mEq in 100 mL IVPB, 10 mEq, Intravenous, Q1H PRN, Fernando Bates APRN  •  promethazine (PHENERGAN) tablet 12.5 mg, 12.5 mg, Oral, Q6H PRN, Mariana Joseph MD  •  sodium chloride 0.9 % flush 10 mL, 10 mL, Intravenous, Q12H, Mariana Joseph MD, 10 mL at 11/02/19 0848  •  sodium chloride 0.9 % flush 10 mL, 10 mL, Intravenous, PRN, Mariana Joseph MD  •  vancomycin 750 mg in sodium chloride 0.9 % 100 mL IVPB, 750 mg, Intravenous, Q12H, Fernando Bates APRN  •  vasopressin 20 units/100 mL (0.2 units/mL) 0.9% NS infusion, 0.03  Units/min, Intravenous, Continuous, Fernando Bates APRN, Last Rate: 9 mL/hr at 11/02/19 0458, 0.03 Units/min at 11/02/19 0458      DIAGNOSTIC DATA:     I reviewed the patient's new clinical results.    Lab Results (last 24 hours)     Procedure Component Value Units Date/Time    POC Glucose Once [[id number]]  (Abnormal) Collected:  11/02/19 0819    Specimen:  Blood Updated:  11/02/19 0820     Glucose 207 mg/dL      Comment: Serial Number: [id number]:  [id number]       Respiratory Culture - Sputum, Cough [[id number]] Collected:  11/01/19 2351    Specimen:  Sputum from Cough Updated:  11/02/19 0704     Gram Stain Few (2+) WBCs per low power field      Few (2+) Epithelial cells per low power field      Many (4+) Gram negative bacilli      Few (2+) Gram positive cocci    Basic Metabolic Panel [[id number]]  (Abnormal) Collected:  11/02/19 0250    Specimen:  Blood Updated:  11/02/19 0319     Glucose 285 mg/dL      BUN 17 mg/dL      Creatinine 0.36 mg/dL      Sodium 120 mmol/L      Potassium 3.2 mmol/L      Chloride 82 mmol/L      CO2 30.0 mmol/L      Calcium 7.7 mg/dL      eGFR Non African Amer >150 mL/min/1.73      BUN/Creatinine Ratio 47.2     Anion Gap 8.0 mmol/L     Narrative:       GFR Normal >60  Chronic Kidney Disease <60  Kidney Failure <15    Osmolality, Serum [[id number]]  (Abnormal) Collected:  11/02/19 0250    Specimen:  Blood Updated:  11/02/19 0319     Osmolality 259 mOsm/kg     Basic Metabolic Panel [[id number]]  (Abnormal) Collected:  11/02/19 0250    Specimen:  Blood Updated:  11/02/19 0319     Glucose 287 mg/dL      BUN 17 mg/dL      Creatinine 0.33 mg/dL      Sodium 120 mmol/L      Potassium 3.2 mmol/L      Chloride 82 mmol/L      CO2 30.0 mmol/L      Calcium 7.3 mg/dL      eGFR Non African Amer >150 mL/min/1.73      BUN/Creatinine Ratio 51.5     Anion Gap 8.0 mmol/L     Narrative:       GFR Normal >60  Chronic Kidney Disease <60  Kidney Failure <15    CBC & Differential [[id number]] Collected:   11/02/19 0250    Specimen:  Blood Updated:  11/02/19 0318    Narrative:       The following orders were created for panel order CBC & Differential.  Procedure                               Abnormality         Status                     ---------                               -----------         ------                     CBC Auto Differential[[id number]]        Abnormal            Final result                 Please view results for these tests on the individual orders.    CBC Auto Differential [[id number]]  (Abnormal) Collected:  11/02/19 0250    Specimen:  Blood Updated:  11/02/19 0318     WBC <0.20 10*3/mm3      RBC 3.26 10*6/mm3      Hemoglobin 9.4 g/dL      Hematocrit 26.8 %      MCV 82.2 fL      MCH 28.8 pg      MCHC 35.0 g/dL      RDW 13.5 %      RDW-SD 38.1 fl      MPV 9.0 fL      Platelets 55 10*3/mm3     Calcium, Ionized [[id number]]  (Abnormal) Collected:  11/02/19 0250    Specimen:  Blood Updated:  11/02/19 0306     Ionized Calcium 1.11 mmol/L     Respiratory Panel, PCR - Swab, Nasopharynx [[id number]]  (Abnormal) Collected:  11/01/19 2239    Specimen:  Swab from Nasopharynx Updated:  11/02/19 0223     ADENOVIRUS, PCR Not Detected     Coronavirus 229E Not Detected     Coronavirus HKU1 Not Detected     Coronavirus NL63 Not Detected     Coronavirus OC43 Not Detected     Human Metapneumovirus Not Detected     Human Rhinovirus/Enterovirus Detected     Influenza B PCR Not Detected     Parainfluenza Virus 1 Not Detected     Parainfluenza Virus 2 Not Detected     Parainfluenza Virus 3 Not Detected     Parainfluenza Virus 4 Not Detected     Bordetella pertussis pcr Not Detected     Influenza A H1 2009 PCR Not Detected     Chlamydophila pneumoniae PCR Not Detected     Mycoplasma pneumo by PCR Not Detected     Influenza A PCR Not Detected     Influenza A H3 Not Detected     Influenza A H1 Not Detected     RSV, PCR Not Detected    TSH [[id number]]  (Normal) Collected:  11/01/19 2340    Specimen:  Blood Updated:   11/02/19 0151     TSH 0.386 uIU/mL     T4, Free [[id number]]  (Normal) Collected:  11/01/19 2340    Specimen:  Blood Updated:  11/02/19 0151     Free T4 1.27 ng/dL      Comment: Results may be falsely increased if patient taking Biotin.       Sodium, Urine, Random - Urine, Clean Catch [[id number]] Collected:  11/01/19 2238    Specimen:  Urine, Clean Catch Updated:  11/02/19 0150     Sodium, Urine <20 mmol/L     Narrative:       Reference intervals for random urine have not been established.  Clinical usage is dependent upon physician's interpretation in combination with other laboratory tests.     Osmolality, Urine - Urine, Clean Catch [[id number]]  (Normal) Collected:  11/01/19 2238    Specimen:  Urine, Clean Catch Updated:  11/02/19 0145     Osmolality, Urine 520 mOsm/kg     Legionella Antigen, Urine - Urine, Urine, Clean Catch [[id number]]  (Normal) Collected:  11/01/19 2238    Specimen:  Urine, Clean Catch Updated:  11/02/19 0124     LEGIONELLA ANTIGEN, URINE Negative    S. Pneumo Ag Urine or CSF - Urine, Urine, Clean Catch [[id number]]  (Normal) Collected:  11/01/19 2238    Specimen:  Urine, Clean Catch Updated:  11/02/19 0124     Strep Pneumo Ag Negative    Blood Culture - Blood, Hand, Right [[id number]] Collected:  11/02/19 0003    Specimen:  Blood from Hand, Right Updated:  11/02/19 0037    Creatinine, Serum [[id number]]  (Abnormal) Collected:  11/01/19 2340    Specimen:  Blood Updated:  11/02/19 0015     Creatinine 0.33 mg/dL      eGFR Non African Amer >150 mL/min/1.73     Narrative:       GFR Normal >60  Chronic Kidney Disease <60  Kidney Failure <15    Blood Culture - Blood, Blood, Central Line [[id number]] Collected:  11/01/19 2341    Specimen:  Blood, Central Line Updated:  11/01/19 2356    MRSA Screen Culture - Swab, Nares [[id number]] Collected:  11/01/19 2240    Specimen:  Swab from Nares Updated:  11/01/19 2251    Basic Metabolic Panel [[id number]]  (Abnormal) Collected:  11/01/19 1626    Specimen:  Blood  Updated:  11/01/19 1711     Glucose 162 mg/dL      BUN 21 mg/dL      Creatinine 0.42 mg/dL      Sodium 118 mmol/L      Potassium 4.5 mmol/L      Chloride 80 mmol/L      CO2 31.0 mmol/L      Calcium 7.7 mg/dL      eGFR Non African Amer >150 mL/min/1.73      BUN/Creatinine Ratio 50.0     Anion Gap 7.0 mmol/L     Narrative:       GFR Normal >60  Chronic Kidney Disease <60  Kidney Failure <15    Troponin [[id number]]  (Normal) Collected:  11/01/19 1626    Specimen:  Blood Updated:  11/01/19 1705     Troponin T <0.010 ng/mL     Narrative:       Troponin T Reference Range:  <= 0.03 ng/mL-   Negative for AMI  >0.03 ng/mL-     Abnormal for myocardial necrosis.  Clinicians would have to utilize clinical acumen, EKG, Troponin and serial changes to determine if it is an Acute Myocardial Infarction or myocardial injury due to an underlying chronic condition.     Magnesium [[id number]]  (Abnormal) Collected:  11/01/19 1626    Specimen:  Blood Updated:  11/01/19 1705     Magnesium 1.4 mg/dL     Phosphorus [[id number]]  (Normal) Collected:  11/01/19 1626    Specimen:  Blood Updated:  11/01/19 1705     Phosphorus 2.7 mg/dL     CBC & Differential [[id number]] Collected:  11/01/19 1626    Specimen:  Blood Updated:  11/01/19 1702    Narrative:       The following orders were created for panel order CBC & Differential.  Procedure                               Abnormality         Status                     ---------                               -----------         ------                     CBC Auto Differential[[id number]]        Abnormal            Final result                 Please view results for these tests on the individual orders.    CBC Auto Differential [[id number]]  (Abnormal) Collected:  11/01/19 1626    Specimen:  Blood Updated:  11/01/19 1702     WBC <0.20 10*3/mm3      Comment: Differential not indicated due to low WBC.        RBC 2.92 10*6/mm3      Hemoglobin 8.6 g/dL      Hematocrit 24.2 %      MCV 82.8 fL      MCH 29.5 pg       MCHC 35.7 g/dL      RDW 13.4 %      RDW-SD 38.1 fl      MPV 8.3 fL      Platelets 45 10*3/mm3     Lactic Acid, Plasma [[id number]]  (Normal) Collected:  11/01/19 1626    Specimen:  Blood Updated:  11/01/19 1655     Lactate 1.6 mmol/L     Calcium, Ionized [[id number]]  (Abnormal) Collected:  11/01/19 1626    Specimen:  Blood Updated:  11/01/19 1651     Ionized Calcium 1.07 mmol/L     POCT Electrolytes +HGB +HCT [[id number]]  (Abnormal) Collected:  11/01/19 1637    Specimen:  Blood Updated:  11/01/19 1644     Sodium 114 mmol/L      Potassium 4.2 mmol/L      Ionized Calcium 0.86 mmol/L      Comment: Serial Number: [id number]:  [id number]        Glucose 177 mg/dL      Hematocrit 26 %      Hemoglobin 8.9 g/dL     Blood Gas, Arterial [[id number]]  (Abnormal) Collected:  11/01/19 1637    Specimen:  Arterial Blood Updated:  11/01/19 1643     Site Right Radial     Rodriguez's Test Positive     pH, Arterial 7.532 pH units      pCO2, Arterial 35.2 mm Hg      pO2, Arterial 82.6 mm Hg      HCO3, Arterial 29.6 mmol/L      Base Excess, Arterial 6.5 mmol/L      Comment: Serial Number: [id number]:  [id number]        O2 Saturation, Arterial 97.3 %      CO2 Content 30.7 mmol/L      Barometric Pressure for Blood Gas --     Comment: N/A        Modality Cannula     FIO2 <21 %      Hemodilution No    POC Lactate [[id number]]  (Normal) Collected:  11/01/19 1637    Specimen:  Blood Updated:  11/01/19 1643     Lactate 1.4 mmol/L      Comment: Serial Number: [id number]:  [id number]             Imaging Results (Last 24 Hours)     Procedure Component Value Units Date/Time    XR Chest 1 View [[id number]] Collected:  11/01/19 1925     Updated:  11/01/19 1928    Narrative:       DATE OF EXAM:  11/1/2019 6:40 PM     PROCEDURE:  XR CHEST 1 VW-     INDICATIONS:  :Post picc line     COMPARISON:  10/14/2019     TECHNIQUE:   Single radiographic AP view of the chest was obtained.     FINDINGS:  There is a left-sided PICC line with tip in the superior vena  cava.  There is a right-sided Nlnzhl-b-Eruc with tip in the superior vena cava.  There is right basilar airspace disease consistent with pneumonia. There  are small bilateral pleural fluid collections greater on the right than  the left.        Impression:       New left PICC line with the tip in the superior vena cava. Worsening  right basilar infiltrate consistent with pneumonia. Small bilateral  pleural fluid collections greater on the right than the left.     Electronically Signed By-Oliver Crystal On:11/1/2019 7:26 PM  This report was finalized on [id number] by  Oliver Crystal, .          Xray reviewed personally by physician.      ECG reviewed personally by physician  ECG/EMG Results (most recent)     Procedure Component Value Units Date/Time    ECG 12 Lead [[id number]] Collected:  11/01/19 1604     Updated:  11/01/19 1605    Narrative:       HEART RATE= 191  bpm  RR Interval= 314  ms  MN Interval=   ms  P Horizontal Axis=   deg  P Front Axis=   deg  QRSD Interval= 121  ms  QT Interval= 275  ms  QRS Axis= 46  deg  T Wave Axis= 240  deg  - ABNORMAL ECG -  Wide-QRS tachycardia  IVCD, consider LBBB  Electronically Signed By:   Date and Time of Study: 2019-11-01 16:04:05    Adult Transthoracic Echo Complete W/ Cont if Necessary Per Protocol [[id number]] Collected:  11/01/19 1722     Updated:  11/02/19 0038     BSA 1.5 m^2       CV ECHO PJ - RVDD 2.2 cm      IVSd 0.49 cm      LVIDd 5.3 cm      LVIDs 4.8 cm      LVPWd 0.7 cm      IVS/LVPW 0.7     FS 8.5 %      EDV(Teich) 132.5 ml      ESV(Teich) 107.8 ml      EF(Teich) 18.6 %      EDV(cubed) 144.8 ml      ESV(cubed) 110.9 ml      EF(cubed) 23.4 %      LV mass(C)d 101.9 grams      LV mass(C)dI 68.6 grams/m^2      SV(Teich) 24.7 ml      SI(Teich) 16.6 ml/m^2      SV(cubed) 33.9 ml      SI(cubed) 22.8 ml/m^2      Ao root diam 2.9 cm      Ao root area 6.4 cm^2      ACS 1.7 cm      asc Aorta Diam 2.7 cm      LVOT diam 1.8 cm      LVOT area 2.6 cm^2      EDV(MOD-sp4)  112.7 ml      ESV(MOD-sp4) 77.7 ml      EF(MOD-sp4) 31.0 %      SV(MOD-sp4) 35.0 ml      SI(MOD-sp4) 23.6 ml/m^2      Ao root area (BSA corrected) 1.9     LV Solis Vol (BSA corrected) 75.9 ml/m^2      LV Sys Vol (BSA corrected) 52.3 ml/m^2      Aortic R-R 0.57 sec      Aortic .8 BPM      MV V2 max 116.2 cm/sec      MV max PG 5.4 mmHg      MV V2 mean 67.5 cm/sec      MV mean PG 2.1 mmHg      MV V2 VTI 13.9 cm      MVA(VTI) 2.8 cm^2      Ao pk selin 134.3 cm/sec      Ao max PG 7.2 mmHg      Ao max PG (full) 1.6 mmHg      Ao V2 mean 91.0 cm/sec      Ao mean PG 3.9 mmHg      Ao mean PG (full) 1.6 mmHg      Ao V2 VTI 19.0 cm      CALE(I,A) 2.0 cm^2      CALE(I,D) 2.0 cm^2      CALE(V,A) 2.3 cm^2      CALE(V,D) 2.3 cm^2      LV V1 max PG 5.7 mmHg      LV V1 mean PG 2.3 mmHg      LV V1 max 119.0 cm/sec      LV V1 mean 67.5 cm/sec      LV V1 VTI 14.9 cm      MR max selin 415.1 cm/sec      MR max PG 68.9 mmHg      CO(Ao) 12.9 l/min      CI(Ao) 8.7 l/min/m^2      SV(Ao) 122.1 ml      SI(Ao) 82.2 ml/m^2      CO(LVOT) 4.1 l/min      CI(LVOT) 2.8 l/min/m^2      SV(LVOT) 39.0 ml      SI(LVOT) 26.2 ml/m^2      PA V2 max 133.9 cm/sec      PA max PG 7.2 mmHg      PA max PG (full) 4.4 mmHg      PA V2 mean 88.0 cm/sec      PA mean PG 3.5 mmHg      PA mean PG (full) 2.4 mmHg      PA V2 VTI 20.1 cm      RV V1 max PG 2.8 mmHg      RV V1 mean PG 1.1 mmHg      RV V1 max 83.3 cm/sec      RV V1 mean 48.2 cm/sec      RV V1 VTI 12.3 cm      TR max selin 300.8 cm/sec      RVSP(TR) 39.2 mmHg      RAP systole 3.0 mmHg       CV ECHO PJ - BZI_BMI 17.0 kilograms/m^2       CV ECHO PJ - BSA([place]) 1.4 m^2       CV ECHO PJ - BZI_METRIC_WEIGHT 46.3 kg       CV ECHO PJ - BZI_METRIC_HEIGHT 165.1 cm      EF(MOD-bp) 20.0 %      LA dimension(2D) 3.4 cm     Narrative:         · The left ventricular cavity is moderately dilated.  · Left ventricular systolic function is severely decreased.  · Left ventricular diastolic dysfunction (grade III)  consistent with   reversible restrictive pattern.  · Right ventricular cavity is moderately dilated.  · Mildly reduced right ventricular systolic function noted.  · Left atrial cavity size is severely dilated.  · Mild mitral valve regurgitation is present  · Mild tricuspid valve regurgitation is present.  · Mild pulmonic valve regurgitation is present.     Abnormal study  At least moderately dilated LV  Severely reduced LV systolic function EF 20%  Regional abnormalities severely with dyskinetic interventricular septum as   well as akinetic anteroseptum and anterior wall, there is thickening of   the lateral posterior and inferior walls however these are also severely   hypokinetic.  Severe global LV hypokinesis  Severe biatrial enlargement  Cannot rule out mobile echodensity on the aortic valve with a typical   appearance in available limited views  See other report for valvular descriptions  No significant masses or effusion seen                  VITAL SIGNS: Temp:  [97.1 °F (36.2 °C)-97.9 °F (36.6 °C)] 97.3 °F (36.3 °C)  Heart Rate:  [106-154] 144  Resp:  [18-26] 20  BP: ()/() 127/61     Physical exam is unchanged today from yesterday  Constitutional: Malnourished/cachectic appearing older than stated age in mild respiratory distress (tachypneic)  PERRL: Conjunctiva clear, with pallor, anicteric  HENMT: normocephalic, normal dentition, no cyanosis or pallor  Neck:no bruits, or thrills and bilateral normal carotid upstroke. Normal jugular venous pressure  Cardiovascular: Parasternal heave is present with inferolaterally displaced focal PMI.  Tachycardic with irregularly irregular rhythm: no rub, S3 gallop, no murmur or click and normal S1 and S2; no lower or upper but mild pitting bilateral lower extremity edema to the pre-tibia.   Lungs: unlabored, no wheezing with no rales or rhonchi on auscultation.  Extremities: Warm, no clubbing, cyanosis. Full and equal peripheral pulses in extremities with no  "bruits appreciated.   Abdomen: soft, non-tender, non-distended  Musculoskeletal: no joint tenderness or swelling and no erythema  Skin: Warm but skin at the knees is cool and dry, non-erythematous   Neuro:alert and normal affect. Oriented to time, place and person.       Flowsheet Rows      First Filed Value   Admission Height  165.1 cm (65\") Documented at 11/01/2019 1526   Admission Weight  46.5 kg (102 lb 8.2 oz) Documented at 11/01/2019 1526        Physical exam  Constitutional: well-nourished, and appears stated age in no acute distress  PERRL: Conjunctiva clear, no pallor, anicteric  HENMT: normocephalic, normal dentition, no cyanosis or pallor  Neck:no bruits, or thrills and bilateral normal carotid upstroke. Normal jugular venous pressure  Cardiovascular: No parasternal heaves an non-displaced focal PMI. Normal rate and rhythm: no rub, gallop, murmur or click and normal S1 and S2; no lower or upper extremity edema.   Lungs: unlabored, no wheezing with no rales or rhonchi on auscultation.  Extremities: Warm, no clubbing, cyanosis, or edema. Full and equal peripheral pulses in extremities with no bruits appreciated.   Abdomen: soft, non-tender, non-distended  Musculoskeletal: no joint tenderness or swelling and no erythema  Skin: Warm and dry, non-erythematous   Neuro:alert and normal affect. Oriented to time, place and person.     Assessment/Plan      Atrial fibrillation with rapid ventricular response:  -Continue amiodarone drip without other AV nicholas agents as his blood pressure is labile and calcium channel blockers and beta-blockers will likely contribute to relative hypotension in the setting of the above.    This morning we are going to have to add another AV nicholsa agent in the form of Cardizem drip 5 mg starting dose.     Decreased LV systolic function (congestive heart failure):  Likely tachycardia induced LV systolic dysfunction at least in part in the setting of severe anemia and end-stage metastatic " lung disease with cardiogenic low output state in the setting of severe LV systolic dysfunction.  Continue Levophed at this time.  We will look to maintain mean arterial pressure greater than 75 to 80 mmHg.     Hyport[employer]  See above     Severe anemia  He has been typed and crossed and he received 2 units of packed red blood cells.        I discussed the patients findings and my recommendations with patient and daughter.     Greater than 30 minutes total of face-to-face/floor critical care time with hemodynamic stabilization and initiation of the above was spent with the patient and family and nursing coordinating plan and patient management.  Of which counseling of patient with regard specifically to his current cardiogenic hemodynamically labile state with likely need for DC cardioversion due to pressor dependent hypotension from underlying difficult to control rapid atrial fibrillation comprised 50% of this total time.           Fernando Russ MD  11/02/19  10:15 AM

## 2019-11-02 NOTE — PROGRESS NOTES
Malnutrition Severity Assessment    Patient Name:  Mendoza Robert  YOB: 1945  MRN: [id number]  Admit Date:  11/1/2019    Patient meets criteria for : Severe Malnutrition    Comments:  Pt recently completed 6 weeks of chemo and radiation therapy for small cell lung cancer. Pt agreed to Boost Plus BID (provides additional 720 kcal and 28 gm PRO if consumed), ice cream BID, & yogurt at breakfast.     Severe chronic malnutrition RT physiological causes AEB severe muscle wasting and severe fat loss.     Malnutrition Severity Assessment  Malnutrition Type: Chronic Disease - Related Malnutrition     Malnutrition Type (last 8 hours)      Malnutrition Severity Assessment     Row Name 11/02/19 1026       Malnutrition Severity Assessment    Malnutrition Type  Chronic Disease - Related Malnutrition    Row Name 11/02/19 1026       Muscle Loss    Loss of Muscle Mass Findings  Severe    Oriental orthodox Region  Severe - deep hollowing/scooping, lack of muscle to touch, facial bones well defined    Clavicle Bone Region  Severe - protruding prominent bone    Acromion Bone Region  Severe - squared shoulders, bones, and acromion process protrusion prominent    Scapular Bone Region  Severe - prominent bones, depressions easily visible between ribs, scapula, spine, shoulders    Dorsal Hand Region  None    Patellar Region  Severe - prominent bone, square looking, very little muscle definition    Anterior Thigh Region  Severe - line/depression along thigh, obviously thin    Posterior Calf Region  Severe - thin with very little definition/firmness    Row Name 11/02/19 1026       Fat Loss    Subcutaneous Fat Loss Findings  Severe    Orbital Region   Moderate -  somewhat hollowness, slightly dark circles    Upper Arm Region  Severe - mostly skin, very little space between folds, fingers touch    Thoracic & Lumbar Region  Severe - ribs visible with prominent depressions, iliac crest very prominent    Row Name 11/02/19 1026        Criteria Met (Must meet criteria for severity in at least 2 of these categories: M Wasting, Fat Loss, Fluid, Secondary Signs, Wt. Status, Intake)    Patient meets criteria for   Severe Malnutrition          Electronically signed by:  Britta Campos RD  11/02/19 10:31 AM

## 2019-11-02 NOTE — CONSULTS
"Adult Nutrition  Assessment/PES    Patient Name:  Mendoza Robert  YOB: 1945  MRN: [id number]  Admit Date:  11/1/2019    Assessment Date:  11/2/2019    Comments:  Agreed to ice cream BID, Boost plus BID, yogurt at Breakfast. Boost Plus BID provides additional 720 kcal and 28 gm PRO if consumed.      Reason for Assessment     Row Name 11/02/19 1018          Reason for Assessment    Reason For Assessment  -- Low BMI     Diagnosis  -- PMH: AFib, COPD, PTSD, Lung CA, PNA     Identified At Risk by Screening Criteria  -- Current Dx: IV Small cell lung CA, Afib w/RVR, CHF, EF 20%, Hypocalcemia, Hyponatremia, Severe Leukopenia           Nutrition/Diet History     Row Name 11/02/19 1020          Nutrition/Diet History    Typical Food/Fluid Intake  Unable to eat much d/t chemo therapy (just finished). Does like supplements, yogurt, and ice cream. Confirmed weight loss.      Food Allergies  -- No known food allergies         Anthropometrics     Row Name 11/02/19 1021          Anthropometrics    Height  165.1 cm (65\")     Weight  46.5 kg (102 lb 8.2 oz) 11/1/19        Admit Weight    Admit Weight  46.5 kg (102 lb 8.2 oz) 11/1/19        Ideal Body Weight (IBW)    Ideal Body Weight (IBW) (kg)  62.51     % Ideal Body Weight  74.39        Usual Body Weight (UBW)    Usual Body Weight  49.9 kg (110 lb)     % Usual Body Weight  93.19     Weight Loss Time Frame  Wt hx: 104 lb (10/23/19), 103 lb (10/7/19)         Body Mass Index (BMI)    BMI (kg/m2)  17.09         Labs/Tests/Procedures/Meds     Row Name 11/02/19 1023          Labs/Procedures/Meds    Lab Results Comments  Glucose 207/285/287 (H), Na 120 (L), K 3.2 (L), Crt .33 (L), H/H 9.4/26.8 (L)        Medications    Pertinent Medications Comments  Lipitor, humalog, Magox, MVI w/ iron, Protonix, Vanc         Physical Findings     Row Name 11/02/19 1024          Physical Findings    Overall Physical Appearance  -- Appears Malnourished; NFPE completed 11/2/19, See MSA  " "       Estimated/Assessed Needs     Row Name 11/02/19 1025 11/02/19 1021       Calculation Measurements    Height  --  165.1 cm (65\")       Estimated/Assessed Needs    Additional Documentation  Calorie Requirements (Group);Fluid Requirements (Group);Protein Requirements (Group)  --       Calorie Requirements    Weight Used For Calorie Calculations  -- -  --    Estimated Calorie Need Method  -- -  --    Estimated Calorie Requirement Comment  -- -  --       Protein Requirements    Weight Used For Protein Calculations  -- -  --    Est Protein Requirement Amount (gms/kg)  -- -  --       Fluid Requirements    Estimated Fluid Requirements (mL/day)  -- -  --        Nutrition Prescription Ordered     Row Name 11/02/19 1025          Nutrition Prescription PO    Current PO Diet  Regular         Evaluation of Received Nutrient/Fluid Intake     Row Name 11/02/19 1026          PO Evaluation    Number of Days PO Intake Evaluated  -- -     Number of Meals  -- -     % PO Intake  none yet        EN Evaluation    Number of Days EN Intake Evaluated  -- -     TF Changes  -- -     TF Residual  -- -     TF Tolerance  -- -           Malnutrition Severity Assessment     Row Name 11/02/19 1026          Malnutrition Severity Assessment    Malnutrition Type  Chronic Disease - Related Malnutrition        Muscle Loss    Loss of Muscle Mass Findings  Severe     Alevism Region  Severe - deep hollowing/scooping, lack of muscle to touch, facial bones well defined     Clavicle Bone Region  Severe - protruding prominent bone     Acromion Bone Region  Severe - squared shoulders, bones, and acromion process protrusion prominent     Scapular Bone Region  Severe - prominent bones, depressions easily visible between ribs, scapula, spine, shoulders     Dorsal Hand Region  None     Patellar Region  Severe - prominent bone, square looking, very little muscle definition     Anterior Thigh Region  Severe - line/depression along thigh, obviously thin     " Posterior Calf Region  Severe - thin with very little definition/firmness        Fat Loss    Subcutaneous Fat Loss Findings  Severe     Orbital Region   Moderate -  somewhat hollowness, slightly dark circles     Upper Arm Region  Severe - mostly skin, very little space between folds, fingers touch     Thoracic & Lumbar Region  Severe - ribs visible with prominent depressions, iliac crest very prominent        Criteria Met (Must meet criteria for severity in at least 2 of these categories: M Wasting, Fat Loss, Fluid, Secondary Signs, Wt. Status, Intake)    Patient meets criteria for   Severe Malnutrition           Problem/Interventions:  Problem 1     Row Name 11/02/19 1026          Nutrition Diagnoses Problem 1    Problem 1  -- Severe chronic malnutrition      Etiology (related to)  -- physiological causes (Lung CA with chemo & radiation)      Signs/Symptoms (evidenced by)  -- severe muscle wasting and severe fat loss.                Intervention Goal     Row Name 11/02/19 1028          Intervention Goal    General  -- Eats > 65% of meals, Consumes 1-2 ONS         Nutrition Intervention     Row Name 11/02/19 1028          Nutrition Intervention    RD/Tech Action  Recommend/ordered     Recommended/Ordered  Supplement         Nutrition Prescription     Row Name 11/02/19 1028          Nutrition Prescription PO    PO Prescription  Begin/change supplement     Supplement  Yogurt;Ice Cream;Boost Plus     Supplement Frequency  2 times a day Boost Plus BID, Yogurt QD, Ice Cream BID          Education/Evaluation     Row Name 11/02/19 1029          Monitor/Evaluation    Monitor  PO intake;Supplement intake;Pertinent labs;Weight;Skin status BM           Electronically signed by:  Britta Campos RD  11/02/19 10:30 AM

## 2019-11-02 NOTE — PROGRESS NOTES
"Pharmacy Dosing Service  Antibiotics  Vancomycin  Cefepime    Subjective:  Mendoza Robert is a 73 y.o.male admitted with SVT + pancytopenia. Pt has h/o stage IV SCLC s/p 6 weeks chemotherapy + radiation. Pharmacy to dose vancomycin for PNA.  Goal trough 10-20 mcg/mL and goal -600 mcg*h/mL.      Assessment/Plan  1. Day #1 Vancomycin: 1000 mg loading dose (~21.5 mg/kg ABW) followed by 750 mg (~16 mg/kg ABW) IV q12h. Check pharmacokinetic levels prior to 4th total dose on 11/3.    2. Day #1 Cefepime: 2g IV q12h for CrCl 30-59 ml/min.    Continue to monitor drug levels, renal function, culture and sensitivities, and patient clinical status.       Objective:  Relevant clinical data and objective history reviewed:  165.1 cm (65\")   46.5 kg (102 lb 8.2 oz)   Ideal body weight: 62.7 kg (138 lb 5.4 oz)  Body mass index is 17.06 kg/m².        Results from last 7 days   Lab Units 11/02/19  0250 11/01/19  2340 11/01/19  1626   CREATININE mg/dL 0.36*  0.33* 0.33* 0.42*     Estimated Creatinine Clearance: 55.7 mL/min (A) (by C-G formula based on SCr of 0.33 mg/dL (L)).  I/O last 3 completed shifts:  In: 2631.8 [P.O.:240; I.V.:1813; Blood:578.8]  Out: 1125 [Urine:1125]    WBC   Date Value Ref Range Status   11/02/2019 <0.20 (C) 3.40 - 10.80 10*3/mm3 Final   11/01/2019 <0.20 (C) 3.40 - 10.80 10*3/mm3 Final     Comment:     Differential not indicated due to low WBC.   11/01/2019 0.10 (C) 3.40 - 10.80 10*3/mm3 Final   08/01/2019 9.7 3.4 - 10.8 x10E3/uL Final     Temperature    11/02/19 0401 11/02/19 0545 11/02/19 0800   Temp: 97.9 °F (36.6 °C) 97.5 °F (36.4 °C) 97.3 °F (36.3 °C)     Baseline culture/source/susceptibility:  Microbiology Results (last 10 days)       Procedure Component Value - Date/Time    Respiratory Culture - Sputum, Cough [[id number]] Collected:  11/01/19 2299    Lab Status:  Preliminary result Specimen:  Sputum from Cough Updated:  11/02/19 0704     Gram Stain Few (2+) WBCs per low power field      Few " (2+) Epithelial cells per low power field      Many (4+) Gram negative bacilli      Few (2+) Gram positive cocci    Respiratory Panel, PCR - Swab, Nasopharynx [[id number]]  (Abnormal) Collected:  11/01/19 2239    Lab Status:  Final result Specimen:  Swab from Nasopharynx Updated:  11/02/19 0223     ADENOVIRUS, PCR Not Detected     Coronavirus 229E Not Detected     Coronavirus HKU1 Not Detected     Coronavirus NL63 Not Detected     Coronavirus OC43 Not Detected     Human Metapneumovirus Not Detected     Human Rhinovirus/Enterovirus Detected     Influenza B PCR Not Detected     Parainfluenza Virus 1 Not Detected     Parainfluenza Virus 2 Not Detected     Parainfluenza Virus 3 Not Detected     Parainfluenza Virus 4 Not Detected     Bordetella pertussis pcr Not Detected     Influenza A H1 2009 PCR Not Detected     Chlamydophila pneumoniae PCR Not Detected     Mycoplasma pneumo by PCR Not Detected     Influenza A PCR Not Detected     Influenza A H3 Not Detected     Influenza A H1 Not Detected     RSV, PCR Not Detected    Legionella Antigen, Urine - Urine, Urine, Clean Catch [[id number]]  (Normal) Collected:  11/01/19 2238    Lab Status:  Final result Specimen:  Urine, Clean Catch Updated:  11/02/19 0124     LEGIONELLA ANTIGEN, URINE Negative    S. Pneumo Ag Urine or CSF - Urine, Urine, Clean Catch [[id number]]  (Normal) Collected:  11/01/19 2238    Lab Status:  Final result Specimen:  Urine, Clean Catch Updated:  11/02/19 0124     Strep Pneumo Ag Negative             Anti-Infectives (From admission, onward)      Ordered     Dose/Rate Route Frequency Start Stop    11/02/19 0921  cefepime 2 gm IVPB in 100 ml NS (MBP)     Ordering Provider:  Mariana Joseph MD    2 g Intravenous Every 12 Hours 11/02/19 2100 11/08/19 2059 11/01/19 2233  vancomycin 750 mg in sodium chloride 0.9 % 100 mL IVPB     Ordering Provider:  Fernando Bates APRN    750 mg Intravenous Every 12 Hours 11/02/19 1200 11/05/19 1159    11/01/19 2233   vancomycin 1000 mg/250 mL 0.9% NS IVPB ([facility])     Ordering Provider:  Fernando Bates APRN    1,000 mg Intravenous Once 11/02/19 0000 11/02/19 0038    11/01/19 2224  Pharmacy to dose vancomycin     Ordering Provider:  Fernando Bates APRN     Does not apply Continuous PRN 11/01/19 2224 11/08/19 2223           Amy Moss, PharmD, BCACP, BCPS, BCCCP  9:21 AM 11/2/2019

## 2019-11-02 NOTE — PROCEDURES
"Electrical Cardioversion  Date/Time: 11/2/2019 3:41 PM  Performed by: Fernando Russ MD  Authorized by: Fernando Russ MD   Consent: The procedure was performed in an emergent situation. Verbal consent obtained.  Risks and benefits: risks, benefits and alternatives were discussed  Consent given by: patient  Patient understanding: patient states understanding of the procedure being performed  Patient consent: the patient's understanding of the procedure matches consent given  Procedure consent: procedure consent matches procedure scheduled  Relevant documents: relevant documents present and verified  Test results: test results available and properly labeled  Site marked: the operative site was marked  Imaging studies: imaging studies available  Required items: required blood products, implants, devices, and special equipment available  Patient identity confirmed: verbally with patient  Time out: Immediately prior to procedure a \"time out\" was called to verify the correct patient, procedure, equipment, support staff and site/side marked as required.    Sedation:  Patient sedated: yes  Sedation type: moderate (conscious) sedation  Sedatives: midazolam  Analgesia: fentanyl  Sedation start date/time: 11/2/2019 3:32 PM  Sedation end date/time: 11/2/2019 3:35 PM    Cardioversion basis: emergent  Pre-procedure rhythm: atrial fibrillation  Patient position: patient was placed in a supine position  Chest area: chest area exposed  Electrodes: pads  Electrodes placed: anterior-posterior  Number of attempts: 1  Attempt 1 mode: synchronous  Attempt 1 shock (in Joules): 200  Attempt 1 outcome: conversion to normal sinus rhythm  Post-procedure rhythm: atrial fibrillation  Complications: no complications  Patient tolerance: Patient tolerated the procedure well with no immediate complications      Indication for procedure:    1.  Rapid atrial fibrillation with heme dynamic compromise  2.  " Hypertension    Procedures performed:    1.  Direct-current electrical cardioversion, emergent with biphasic synchronized cardioversion    Atrial fibrillation was not able to be controlled with a chemical therapy and no chemical conversion was achieved.  Therefore given his hypotension and recalcitrant atrial fibrillation with very rapid ventricular response in the 170s, which shows to do an emergent electrical DC cardioversion.  Patient had the cardioversion pads placed anterior posteriorly.  The  was set to level of 200 J with biphasic defibrillator with synchronized capture with a single charge and immediate conversion to normal sinus rhythm.  However this only lasted approximately 20 to 25 seconds.  Patient reverted back into rapid atrial fib albeit at a more controlled rate of 110 to 115 bpm.    No further attempts were attempted.

## 2019-11-02 NOTE — PLAN OF CARE
Problem: Patient Care Overview  Goal: Plan of Care Review  Outcome: Ongoing (interventions implemented as appropriate)   11/02/19 0313   Coping/Psychosocial   Plan of Care Reviewed With patient;family   Plan of Care Review   Progress no change   OTHER   Outcome Summary Pt is requiring Td and Vaso drips for hypotension, Amio drip for Afib. HR remaining 120s-150s. Requiring 5L of oxygen to keep sats above 90%. One unit of PRBC given, another waiting to be administered.       Problem: Fall Risk (Adult)  Goal: Identify Related Risk Factors and Signs and Symptoms  Outcome: Ongoing (interventions implemented as appropriate)   11/02/19 0313   Fall Risk (Adult)   Related Risk Factors (Fall Risk) gait/mobility problems;polypharmacy;environment unfamiliar   Signs and Symptoms (Fall Risk) presence of risk factors     Goal: Absence of Fall  Outcome: Ongoing (interventions implemented as appropriate)   11/02/19 0313   Fall Risk (Adult)   Absence of Fall making progress toward outcome       Problem: Skin Injury Risk (Adult)  Goal: Identify Related Risk Factors and Signs and Symptoms  Outcome: Ongoing (interventions implemented as appropriate)   11/02/19 0313   Skin Injury Risk (Adult)   Related Risk Factors (Skin Injury Risk) advanced age;critical care admission;mobility impaired;nutritional deficiencies     Goal: Skin Health and Integrity  Outcome: Ongoing (interventions implemented as appropriate)   11/02/19 0313   Skin Injury Risk (Adult)   Skin Health and Integrity making progress toward outcome

## 2019-11-03 NOTE — DISCHARGE SUMMARY
Pulmonary/ Critical Care/ sleep medicine death summary Note    Date of Discharge:  11/3/2019    Cause of death: Acute on chronic respiratory failure secondary to extensive lung cancer with metastatic disease  Tobacco abuse: Current smoker, 106-pack-year history    Presenting Problem/History of Present Illness  Active Hospital Problems    Diagnosis  POA   • **Supraventricular tachycardia (CMS/HCC) [I47.1]  Yes   • Thrombocytopenia (CMS/HCC) [D69.6]  Yes   • COPD (chronic obstructive pulmonary disease) (CMS/HCC) [J44.9]  Yes   • Lung cancer (CMS/HCC) [C34.90]  Yes      Resolved Hospital Problems   No resolved problems to display.     Hospital Course  The patient was a 73-year-old male with a history of stage IV small cell lung cancer who had just completed 6 weeks of chemotherapy and radiation.  Reportedly the patient had significant weight loss, generalized fatigue, and malaise for the past 3 weeks.  The patient reported that hehad no strength and had difficulty with deep inspiration.  He denied recent fever, chills, nausea, vomiting, chest pain.  He reported chronic shortness of air which had mildly worsened.  The patient presented to the outpatient cancer center to receive Neupogen however was found to be tachycardic and hypotensive with concomitant chills and was subsequently a direct admission to [facility].  Upon admission he was found to have a heart rate in the 190s and EKG showed a wide-complex tachycardia.  Dr. Joseph discussed the patient's CODE STATUS with the patient and his family at the bedside.  The patient reiterated that he was a DNR with no defibrillation, no intubation, no CPR, however he did want to be full treatment up to that point including medication administration.  The patient was transferred to the CVICU for further evaluation and treatment.  On arrival to CVICU he was immediately evaluated by Dr. Russ, cardiology.  The patient again stated that he wished to be a DNR and  verbalized understanding of that situation.  Cardioversion was described to him in relation to his persistent tachycardia and he was agreeable to cardioversion.  The tachycardia was refractory to carotid massage, 5 mg of IV metoprolol, IV digoxin 0.125.  The patient remained relatively hypotensive with MAP as low as 58 mmHg and a Levophed drip was initiated.  Saturations remained above 90% on room air and the patient remained awake, alert, oriented x3 and calm.  Amiodarone bolus and drip were initiated.  An echocardiogram was ordered stat which was read by Dr. Russ at the bedside.  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  Point-of-care labs revealed a sodium of 114, ionized calcium 0.85, hemoglobin 6.7.  Calcium replacement and 2 units PRBC were ordered however lab but resulted a hemoglobin of 8.9 and blood was not issued.    Overnight the patient continued to have a labile blood pressure and atrial fibrillation with RVR.  On 11/2/2019 Dr. Russ saw the patient again and determined that the patient may benefit from cardioversion as he had persistent hypotension.  This was discussed extensively with patient who was agreeable to cardioversion x1.  The patient was premedicated with medazepam and fentanyl for his comfort and cardioverted with 200 J.  The patient briefly exhibited sinus rhythm however converted back to A. fib with RVR.  The patient tolerated the procedure well with no immediate complications.  Later in the day the patient began to have coarse rhonchi and increased O2 need.  NT suctioning revealed creamy secretions which may have been the result of regurgitation and aspiration of nutritional supplement which had been consumed earlier in the day.  Mucolytic nebs were ordered and administered to liquefy secretions and NT suctioning was performed PRN.  Further  discussions were held with the family that the patient had taken a turn for the worse and comfort care was discussed at detail.  Later in the evening the family did decide to make the patient comfort care and pressor therapy was discontinued.  The patient remained primarily unresponsive throughout the night.  By this morning the patient was exhibiting agonal respirations and was nonresponsive to tactile stimuli.  His heart rate slowed to a sinus rhythm of 60 and then 40.  Ultimately the patient  with his family around him at 9:05 AM    Procedures Performed    1541 Electrical Cardioversion    Labs/radiological studies:  Lab Results (last 72 hours)     Procedure Component Value Units Date/Time    Blood Culture - Blood, Hand, Right [[id number]] Collected:  19 0003    Specimen:  Blood from Hand, Right Updated:  19 0045     Blood Culture No growth at 24 hours    Blood Culture - Blood, Blood, Central Line [[id number]] Collected:  19 2341    Specimen:  Blood, Central Line Updated:  19 0000     Blood Culture No growth at 24 hours    POC Glucose Once [[id number]]  (Abnormal) Collected:  19 1626    Specimen:  Blood Updated:  19 1629     Glucose 209 mg/dL      Comment: Serial Number: [id number]:  [id number]       Basic Metabolic Panel [[id number]]  (Abnormal) Collected:  19 105    Specimen:  Blood Updated:  19 1143     Glucose 172 mg/dL      BUN 15 mg/dL      Creatinine 0.37 mg/dL      Sodium 122 mmol/L      Potassium 3.1 mmol/L      Chloride 84 mmol/L      CO2 29.0 mmol/L      Calcium 7.7 mg/dL      eGFR Non African Amer >150 mL/min/1.73      BUN/Creatinine Ratio 40.5     Anion Gap 9.0 mmol/L     Narrative:       GFR Normal >60  Chronic Kidney Disease <60  Kidney Failure <15    Magnesium [[id number]]  (Abnormal) Collected:  19 105    Specimen:  Blood Updated:  19 1143     Magnesium 1.2 mg/dL     CBC & Differential [[id number]] Collected:  19 105     Specimen:  Blood Updated:  11/02/19 1112    Narrative:       The following orders were created for panel order CBC & Differential.  Procedure                               Abnormality         Status                     ---------                               -----------         ------                     CBC Auto Differential[[id number]]        Abnormal            Final result                 Please view results for these tests on the individual orders.    CBC Auto Differential [[id number]]  (Abnormal) Collected:  11/02/19 1054    Specimen:  Blood Updated:  11/02/19 1112     WBC 0.20 10*3/mm3      Comment: Differential not indicated due to low WBC.         RBC 3.74 10*6/mm3      Hemoglobin 10.6 g/dL      Hematocrit 30.9 %      MCV 82.8 fL      MCH 28.4 pg      MCHC 34.3 g/dL      RDW 13.6 %      RDW-SD 38.9 fl      MPV 8.3 fL      Platelets 50 10*3/mm3     POC Glucose Once [[id number]]  (Abnormal) Collected:  11/02/19 0819    Specimen:  Blood Updated:  11/02/19 0820     Glucose 207 mg/dL      Comment: Serial Number: [id number]:  [id number]       Basic Metabolic Panel [[id number]]  (Abnormal) Collected:  11/02/19 0250    Specimen:  Blood Updated:  11/02/19 0319     Glucose 285 mg/dL      BUN 17 mg/dL      Creatinine 0.36 mg/dL      Sodium 120 mmol/L      Potassium 3.2 mmol/L      Chloride 82 mmol/L      CO2 30.0 mmol/L      Calcium 7.7 mg/dL      eGFR Non African Amer >150 mL/min/1.73      BUN/Creatinine Ratio 47.2     Anion Gap 8.0 mmol/L     Narrative:       GFR Normal >60  Chronic Kidney Disease <60  Kidney Failure <15    Osmolality, Serum [[id number]]  (Abnormal) Collected:  11/02/19 0250    Specimen:  Blood Updated:  11/02/19 0319     Osmolality 259 mOsm/kg     Basic Metabolic Panel [[id number]]  (Abnormal) Collected:  11/02/19 0250    Specimen:  Blood Updated:  11/02/19 0319     Glucose 287 mg/dL      BUN 17 mg/dL      Creatinine 0.33 mg/dL      Sodium 120 mmol/L      Potassium 3.2 mmol/L      Chloride 82  mmol/L      CO2 30.0 mmol/L      Calcium 7.3 mg/dL      eGFR Non African Amer >150 mL/min/1.73      BUN/Creatinine Ratio 51.5     Anion Gap 8.0 mmol/L     Narrative:       GFR Normal >60  Chronic Kidney Disease <60  Kidney Failure <15    CBC & Differential [[id number]] Collected:  11/02/19 0250    Specimen:  Blood Updated:  11/02/19 0318    Narrative:       The following orders were created for panel order CBC & Differential.  Procedure                               Abnormality         Status                     ---------                               -----------         ------                     CBC Auto Differential[[id number]]        Abnormal            Final result                 Please view results for these tests on the individual orders.    CBC Auto Differential [[id number]]  (Abnormal) Collected:  11/02/19 0250    Specimen:  Blood Updated:  11/02/19 0318     WBC <0.20 10*3/mm3      RBC 3.26 10*6/mm3      Hemoglobin 9.4 g/dL      Hematocrit 26.8 %      MCV 82.2 fL      MCH 28.8 pg      MCHC 35.0 g/dL      RDW 13.5 %      RDW-SD 38.1 fl      MPV 9.0 fL      Platelets 55 10*3/mm3     Calcium, Ionized [[id number]]  (Abnormal) Collected:  11/02/19 0250    Specimen:  Blood Updated:  11/02/19 0306     Ionized Calcium 1.11 mmol/L     Respiratory Panel, PCR - Swab, Nasopharynx [[id number]]  (Abnormal) Collected:  11/01/19 2239    Specimen:  Swab from Nasopharynx Updated:  11/02/19 0223     ADENOVIRUS, PCR Not Detected     Coronavirus 229E Not Detected     Coronavirus HKU1 Not Detected     Coronavirus NL63 Not Detected     Coronavirus OC43 Not Detected     Human Metapneumovirus Not Detected     Human Rhinovirus/Enterovirus Detected     Influenza B PCR Not Detected     Parainfluenza Virus 1 Not Detected     Parainfluenza Virus 2 Not Detected     Parainfluenza Virus 3 Not Detected     Parainfluenza Virus 4 Not Detected     Bordetella pertussis pcr Not Detected     Influenza A H1 2009 PCR Not Detected     Chlamydophila  pneumoniae PCR Not Detected     Mycoplasma pneumo by PCR Not Detected     Influenza A PCR Not Detected     Influenza A H3 Not Detected     Influenza A H1 Not Detected     RSV, PCR Not Detected    TSH [[id number]]  (Normal) Collected:  11/01/19 2340    Specimen:  Blood Updated:  11/02/19 0151     TSH 0.386 uIU/mL     T4, Free [[id number]]  (Normal) Collected:  11/01/19 2340    Specimen:  Blood Updated:  11/02/19 0151     Free T4 1.27 ng/dL      Comment: Results may be falsely increased if patient taking Biotin.       Sodium, Urine, Random - Urine, Clean Catch [[id number]] Collected:  11/01/19 2238    Specimen:  Urine, Clean Catch Updated:  11/02/19 0150     Sodium, Urine <20 mmol/L     Narrative:       Reference intervals for random urine have not been established.  Clinical usage is dependent upon physician's interpretation in combination with other laboratory tests.     Osmolality, Urine - Urine, Clean Catch [[id number]]  (Normal) Collected:  11/01/19 2238    Specimen:  Urine, Clean Catch Updated:  11/02/19 0145     Osmolality, Urine 520 mOsm/kg     Legionella Antigen, Urine - Urine, Urine, Clean Catch [[id number]]  (Normal) Collected:  11/01/19 2238    Specimen:  Urine, Clean Catch Updated:  11/02/19 0124     LEGIONELLA ANTIGEN, URINE Negative    S. Pneumo Ag Urine or CSF - Urine, Urine, Clean Catch [[id number]]  (Normal) Collected:  11/01/19 2238    Specimen:  Urine, Clean Catch Updated:  11/02/19 0124     Strep Pneumo Ag Negative    Creatinine, Serum [[id number]]  (Abnormal) Collected:  11/01/19 2340    Specimen:  Blood Updated:  11/02/19 0015     Creatinine 0.33 mg/dL      eGFR Non African Amer >150 mL/min/1.73     Narrative:       GFR Normal >60  Chronic Kidney Disease <60  Kidney Failure <15    Basic Metabolic Panel [[id number]]  (Abnormal) Collected:  11/01/19 1626    Specimen:  Blood Updated:  11/01/19 1711     Glucose 162 mg/dL      BUN 21 mg/dL      Creatinine 0.42 mg/dL      Sodium 118 mmol/L      Potassium 4.5  mmol/L      Chloride 80 mmol/L      CO2 31.0 mmol/L      Calcium 7.7 mg/dL      eGFR Non African Amer >150 mL/min/1.73      BUN/Creatinine Ratio 50.0     Anion Gap 7.0 mmol/L     Narrative:       GFR Normal >60  Chronic Kidney Disease <60  Kidney Failure <15    Troponin [[id number]]  (Normal) Collected:  11/01/19 1626    Specimen:  Blood Updated:  11/01/19 1705     Troponin T <0.010 ng/mL     Narrative:       Troponin T Reference Range:  <= 0.03 ng/mL-   Negative for AMI  >0.03 ng/mL-     Abnormal for myocardial necrosis.  Clinicians would have to utilize clinical acumen, EKG, Troponin and serial changes to determine if it is an Acute Myocardial Infarction or myocardial injury due to an underlying chronic condition.     Magnesium [[id number]]  (Abnormal) Collected:  11/01/19 1626    Specimen:  Blood Updated:  11/01/19 1705     Magnesium 1.4 mg/dL     Phosphorus [[id number]]  (Normal) Collected:  11/01/19 1626    Specimen:  Blood Updated:  11/01/19 1705     Phosphorus 2.7 mg/dL     CBC & Differential [[id number]] Collected:  11/01/19 1626    Specimen:  Blood Updated:  11/01/19 1702    Narrative:       The following orders were created for panel order CBC & Differential.  Procedure                               Abnormality         Status                     ---------                               -----------         ------                     CBC Auto Differential[[id number]]        Abnormal            Final result                 Please view results for these tests on the individual orders.    CBC Auto Differential [[id number]]  (Abnormal) Collected:  11/01/19 1626    Specimen:  Blood Updated:  11/01/19 1702     WBC <0.20 10*3/mm3      Comment: Differential not indicated due to low WBC.        RBC 2.92 10*6/mm3      Hemoglobin 8.6 g/dL      Hematocrit 24.2 %      MCV 82.8 fL      MCH 29.5 pg      MCHC 35.7 g/dL      RDW 13.4 %      RDW-SD 38.1 fl      MPV 8.3 fL      Platelets 45 10*3/mm3     Lactic Acid, Plasma  [[id number]]  (Normal) Collected:  11/01/19 1626    Specimen:  Blood Updated:  11/01/19 1655     Lactate 1.6 mmol/L     Calcium, Ionized [[id number]]  (Abnormal) Collected:  11/01/19 1626    Specimen:  Blood Updated:  11/01/19 1651     Ionized Calcium 1.07 mmol/L     POCT Electrolytes +HGB +HCT [[id number]]  (Abnormal) Collected:  11/01/19 1637    Specimen:  Blood Updated:  11/01/19 1644     Sodium 114 mmol/L      Potassium 4.2 mmol/L      Ionized Calcium 0.86 mmol/L      Comment: Serial Number: [id number]  [id number]        Glucose 177 mg/dL      Hematocrit 26 %      Hemoglobin 8.9 g/dL     Blood Gas, Arterial [[id number]]  (Abnormal) Collected:  11/01/19 1637    Specimen:  Arterial Blood Updated:  11/01/19 1643     Site Right Radial     Rodriguez's Test Positive     pH, Arterial 7.532 pH units      pCO2, Arterial 35.2 mm Hg      pO2, Arterial 82.6 mm Hg      HCO3, Arterial 29.6 mmol/L      Base Excess, Arterial 6.5 mmol/L      Comment: Serial Number: [id number]:  [id number]        O2 Saturation, Arterial 97.3 %      CO2 Content 30.7 mmol/L      Barometric Pressure for Blood Gas --     Comment: N/A        Modality Cannula     FIO2 <21 %      Hemodilution No    POC Lactate [[id number]]  (Normal) Collected:  11/01/19 1637    Specimen:  Blood Updated:  11/01/19 1643     Lactate 1.4 mmol/L      Comment: Serial Number: [id number]  [id number]           Xr Chest 1 View    Result Date: 11/2/2019  Worsening bibasilar airspace disease, right greater than left with a small right-sided pleural effusion, likely related to worsening pneumonia. New airspace disease is also seen within the right upper lobe.  Electronically Signed By-Taina Nugent On:11/2/2019 6:35 PM This report was finalized on [id number] by  Taina Nugent, .    Xr Chest 1 View    Result Date: 11/1/2019  New left PICC line with the tip in the superior vena cava. Worsening right basilar infiltrate consistent with pneumonia. Small bilateral pleural fluid collections greater  on the right than the left.  Electronically Signed By-Oliver Crystal On:2019 7:26 PM This report was finalized on [id number] by  Oliver Crystal .    Xr Chest Pa & Lateral    Result Date: 10/14/2019  IMPRESSION : Findings consistent with changes of COPD and probable chronic right basilar changes as described above.  Electronically Signed By-Brock Wells On:10/14/2019 6:20 PM This report was finalized on [id number] by  Brock Wells, .      Consults:   Consults     No orders found from 10/3/2019 to 2019.          Condition on Discharge:       Discharge Disposition      Discharge Medications     Discharge Medications      ASK your doctor about these medications      Instructions Start Date   acetaminophen 500 MG tablet  Commonly known as:  TYLENOL   500 mg, Oral, Every 6 Hours PRN      albuterol (5 MG/ML) 0.5% nebulizer solution  Commonly known as:  PROVENTIL   2.5 mg, Nebulization, Every 6 Hours PRN      aspirin 81 MG EC tablet   81 mg, Oral, Daily      atorvastatin 10 MG tablet  Commonly known as:  LIPITOR   10 mg, Oral, Daily      budesonide-formoterol 160-4.5 MCG/ACT inhaler  Commonly known as:  SYMBICORT   2 puffs, Inhalation, 2 Times Daily - RT      docusate sodium 100 MG capsule  Commonly known as:  COLACE   100 mg, Oral, 2 Times Daily PRN      EYE VITAMINS PO   1 capsule, Oral      HYDROcodone-acetaminophen 5-325 MG per tablet  Commonly known as:  NORCO   1 tablet, Oral, Every 6 Hours PRN      ipratropium 0.02 % nebulizer solution  Commonly known as:  ATROVENT   500 mcg, Nebulization, 4 Times Daily - RT      Magnesium Oxide 400 (240 Mg) MG tablet   400 mg, Oral, 2 Times Daily      metoprolol tartrate 25 MG tablet  Commonly known as:  LOPRESSOR   25 mg, Oral, Daily      promethazine 12.5 MG tablet  Commonly known as:  PHENERGAN   12.5 mg, Oral, Every 6 Hours PRN           Test Results Pending at Discharge   Order Current Status    Blood Culture - Blood, Blood, Central Line  Preliminary result    Blood Culture - Blood, Hand, Right Preliminary result    Respiratory Culture - Sputum, Cough Preliminary result           Time: 37 minutes

## 2019-11-03 NOTE — NURSING NOTE
[facility] contacted when patient was made comfort care.  They advised they would not take a consult unless the patient was intubated or  and to call at that time.

## 2019-11-03 NOTE — PLAN OF CARE
Problem: Fall Risk (Adult)  Goal: Identify Related Risk Factors and Signs and Symptoms  Outcome: Ongoing (interventions implemented as appropriate)      Problem: Skin Injury Risk (Adult)  Goal: Identify Related Risk Factors and Signs and Symptoms  Outcome: Ongoing (interventions implemented as appropriate)   11/02/19 0312   Skin Injury Risk (Adult)   Related Risk Factors (Skin Injury Risk) advanced age;critical care admission;mobility impaired;nutritional deficiencies

## 2019-11-03 NOTE — PROGRESS NOTES
Patient made comfort measures overnight.  Patient's chronotropic drive is dropping as is his blood pressure to a mean level is incompatible with life.  He will likely   due to cardiopulmonary arrest within the next hour.

## 2019-11-04 ENCOUNTER — APPOINTMENT (OUTPATIENT)
Dept: WOUND CARE | Facility: HOSPITAL | Age: 74
End: 2019-11-04

## 2019-11-04 LAB
ABO + RH BLD: NORMAL
ABO + RH BLD: NORMAL
BACTERIA SPEC RESP CULT: NORMAL
BH BB BLOOD EXPIRATION DATE: NORMAL
BH BB BLOOD EXPIRATION DATE: NORMAL
BH BB BLOOD TYPE BARCODE: 1700
BH BB BLOOD TYPE BARCODE: 7300
BH BB DISPENSE STATUS: NORMAL
BH BB DISPENSE STATUS: NORMAL
BH BB PRODUCT CODE: NORMAL
BH BB PRODUCT CODE: NORMAL
BH BB UNIT NUMBER: NORMAL
BH BB UNIT NUMBER: NORMAL
GRAM STN SPEC: NORMAL
UNIT  ABO: NORMAL
UNIT  ABO: NORMAL
UNIT  RH: NORMAL
UNIT  RH: NORMAL

## 2019-11-05 LAB
CA-I BLDA-SCNC: 0.85 MMOL/L (ref 1.15–1.33)
GLUCOSE BLDC GLUCOMTR-MCNC: 204 MG/DL (ref 74–100)
HCT VFR BLDA CALC: 20 % (ref 38–51)
HGB BLDA-MCNC: 6.7 G/DL (ref 12–17)
POTASSIUM BLDA-SCNC: 4.2 MMOL/L (ref 3.5–4.5)
SODIUM BLDA-SCNC: 114 MMOL/L (ref 138–146)

## 2019-11-06 ENCOUNTER — APPOINTMENT (OUTPATIENT)
Dept: ONCOLOGY | Facility: CLINIC | Age: 74
End: 2019-11-06

## 2019-11-06 ENCOUNTER — APPOINTMENT (OUTPATIENT)
Dept: LAB | Facility: HOSPITAL | Age: 74
End: 2019-11-06

## 2019-11-06 VITALS
HEART RATE: 102 BPM | HEIGHT: 65 IN | OXYGEN SATURATION: 98 % | WEIGHT: 102.51 LBS | SYSTOLIC BLOOD PRESSURE: 152 MMHG | DIASTOLIC BLOOD PRESSURE: 76 MMHG | RESPIRATION RATE: 20 BRPM | BODY MASS INDEX: 17.08 KG/M2 | TEMPERATURE: 97.8 F

## 2019-11-06 LAB
BACTERIA SPEC AEROBE CULT: NORMAL
BACTERIA SPEC AEROBE CULT: NORMAL